# Patient Record
Sex: FEMALE | Race: WHITE | NOT HISPANIC OR LATINO | Employment: FULL TIME | URBAN - METROPOLITAN AREA
[De-identification: names, ages, dates, MRNs, and addresses within clinical notes are randomized per-mention and may not be internally consistent; named-entity substitution may affect disease eponyms.]

---

## 2017-01-12 ENCOUNTER — OFFICE VISIT (OUTPATIENT)
Dept: FAMILY MEDICINE | Facility: CLINIC | Age: 37
End: 2017-01-12
Payer: COMMERCIAL

## 2017-01-12 VITALS
HEART RATE: 81 BPM | SYSTOLIC BLOOD PRESSURE: 113 MMHG | HEIGHT: 64 IN | OXYGEN SATURATION: 99 % | DIASTOLIC BLOOD PRESSURE: 79 MMHG | WEIGHT: 174 LBS | BODY MASS INDEX: 29.71 KG/M2

## 2017-01-12 DIAGNOSIS — R00.2 HEART PALPITATIONS: ICD-10-CM

## 2017-01-12 DIAGNOSIS — F33.0 MAJOR DEPRESSIVE DISORDER, RECURRENT EPISODE, MILD (H): Primary | ICD-10-CM

## 2017-01-12 DIAGNOSIS — N92.1 BREAKTHROUGH BLEEDING ON DEPO PROVERA: ICD-10-CM

## 2017-01-12 LAB
ALBUMIN SERPL-MCNC: 3.9 G/DL (ref 3.4–5)
ANION GAP SERPL CALCULATED.3IONS-SCNC: 9 MMOL/L (ref 3–14)
BUN SERPL-MCNC: 17 MG/DL (ref 7–30)
CALCIUM SERPL-MCNC: 9.1 MG/DL (ref 8.5–10.1)
CHLORIDE SERPL-SCNC: 110 MMOL/L (ref 94–109)
CO2 SERPL-SCNC: 23 MMOL/L (ref 20–32)
CREAT SERPL-MCNC: 0.92 MG/DL (ref 0.52–1.04)
ERYTHROCYTE [DISTWIDTH] IN BLOOD BY AUTOMATED COUNT: 13.5 % (ref 10–15)
GFR SERPL CREATININE-BSD FRML MDRD: 70 ML/MIN/1.7M2
GLUCOSE SERPL-MCNC: 92 MG/DL (ref 70–99)
HCT VFR BLD AUTO: 42.2 % (ref 35–47)
HGB BLD-MCNC: 13.9 G/DL (ref 11.7–15.7)
MCH RBC QN AUTO: 29.9 PG (ref 26.5–33)
MCHC RBC AUTO-ENTMCNC: 32.9 G/DL (ref 31.5–36.5)
MCV RBC AUTO: 91 FL (ref 78–100)
PHOSPHATE SERPL-MCNC: 2.9 MG/DL (ref 2.5–4.5)
PLATELET # BLD AUTO: 151 10E9/L (ref 150–450)
POTASSIUM SERPL-SCNC: 4.2 MMOL/L (ref 3.4–5.3)
RBC # BLD AUTO: 4.65 10E12/L (ref 3.8–5.2)
SODIUM SERPL-SCNC: 142 MMOL/L (ref 133–144)
TSH SERPL DL<=0.005 MIU/L-ACNC: 0.95 MU/L (ref 0.4–4)
WBC # BLD AUTO: 4.4 10E9/L (ref 4–11)

## 2017-01-12 PROCEDURE — 85027 COMPLETE CBC AUTOMATED: CPT | Performed by: PHYSICIAN ASSISTANT

## 2017-01-12 PROCEDURE — 84443 ASSAY THYROID STIM HORMONE: CPT | Performed by: PHYSICIAN ASSISTANT

## 2017-01-12 PROCEDURE — 99214 OFFICE O/P EST MOD 30 MIN: CPT | Performed by: PHYSICIAN ASSISTANT

## 2017-01-12 PROCEDURE — 36415 COLL VENOUS BLD VENIPUNCTURE: CPT | Performed by: PHYSICIAN ASSISTANT

## 2017-01-12 PROCEDURE — 80069 RENAL FUNCTION PANEL: CPT | Performed by: PHYSICIAN ASSISTANT

## 2017-01-12 RX ORDER — ESCITALOPRAM OXALATE 20 MG/1
20 TABLET ORAL DAILY
Qty: 90 TABLET | Refills: 1 | Status: SHIPPED | OUTPATIENT
Start: 2017-01-12 | End: 2017-09-08

## 2017-01-12 ASSESSMENT — ANXIETY QUESTIONNAIRES
1. FEELING NERVOUS, ANXIOUS, OR ON EDGE: NOT AT ALL
GAD7 TOTAL SCORE: 1
6. BECOMING EASILY ANNOYED OR IRRITABLE: NOT AT ALL
5. BEING SO RESTLESS THAT IT IS HARD TO SIT STILL: NOT AT ALL
3. WORRYING TOO MUCH ABOUT DIFFERENT THINGS: NOT AT ALL
7. FEELING AFRAID AS IF SOMETHING AWFUL MIGHT HAPPEN: NOT AT ALL
2. NOT BEING ABLE TO STOP OR CONTROL WORRYING: SEVERAL DAYS

## 2017-01-12 ASSESSMENT — PATIENT HEALTH QUESTIONNAIRE - PHQ9: 5. POOR APPETITE OR OVEREATING: NOT AT ALL

## 2017-01-12 NOTE — NURSING NOTE
"Chief Complaint   Patient presents with     Depression     Other       Initial /79 mmHg  Pulse 81  Ht 5' 4\" (1.626 m)  Wt 174 lb (78.926 kg)  BMI 29.85 kg/m2  SpO2 99% Estimated body mass index is 29.85 kg/(m^2) as calculated from the following:    Height as of this encounter: 5' 4\" (1.626 m).    Weight as of this encounter: 174 lb (78.926 kg).  BP completed using cuff size: regular      Dodie Mckeon MA    "

## 2017-01-13 ASSESSMENT — PATIENT HEALTH QUESTIONNAIRE - PHQ9: SUM OF ALL RESPONSES TO PHQ QUESTIONS 1-9: 1

## 2017-01-13 ASSESSMENT — ANXIETY QUESTIONNAIRES: GAD7 TOTAL SCORE: 1

## 2017-03-10 ENCOUNTER — TELEPHONE (OUTPATIENT)
Dept: FAMILY MEDICINE | Facility: CLINIC | Age: 37
End: 2017-03-10

## 2017-03-10 ENCOUNTER — ALLIED HEALTH/NURSE VISIT (OUTPATIENT)
Dept: NURSING | Facility: CLINIC | Age: 37
End: 2017-03-10
Payer: MEDICAID

## 2017-03-10 DIAGNOSIS — Z30.9 CONTRACEPTIVE MANAGEMENT: Primary | ICD-10-CM

## 2017-03-10 LAB — BETA HCG QUAL IFA URINE: NEGATIVE

## 2017-03-10 PROCEDURE — 84703 CHORIONIC GONADOTROPIN ASSAY: CPT | Performed by: FAMILY MEDICINE

## 2017-03-10 PROCEDURE — 96372 THER/PROPH/DIAG INJ SC/IM: CPT

## 2017-03-10 NOTE — TELEPHONE ENCOUNTER
Left voicemail for patient to return call to clinic. Patient Depo window is Feb 27th- March 13th. Please help patient schedule ancillary appointment. Thank you.  Dodie Mckeon MA

## 2017-03-10 NOTE — NURSING NOTE
BLOOD PRESSURE: 114 68  (If over 140/90- RN or Provider needs to evaluate)     WEIGHT: 174lb    DATE OF LAST PAP or ANNUAL EXAM: PAP  8/18/14     URINE HCG:negative    MEDICATION: Depo Provera 150mg  ROUTE: IM  SITE: RUQ - Gluteus  : [employer]  LOT #: [id number]  EXPIRATION: 7/2019  NEXT INJECTION DUE: 5/26/17 - 6/9/17  Provider: Kristen Kehr  Reminder card given to patient? Yes   Date order expires: 6/16/17  Last appointment by a provider: 1/12/17    Tracy Nelson MA

## 2017-03-10 NOTE — MR AVS SNAPSHOT
After Visit Summary   3/10/2017    Mary Ma    MRN: [id number]           Patient Information     Date Of Birth          1980        Visit Information        Provider Department      3/10/2017 2:00 PM NE ANCILLARY [facility]        Today's Diagnoses     Contraceptive management    -  1       Follow-ups after your visit        Who to contact     If you have questions or need follow up information about today's clinic visit or your schedule please contact [facility] directly at [phone].  Normal or non-critical lab and imaging results will be communicated to you by ClassBughart, letter or phone within 4 business days after the clinic has received the results. If you do not hear from us within 7 days, please contact the clinic through ClassBughart or phone. If you have a critical or abnormal lab result, we will notify you by phone as soon as possible.  Submit refill requests through ClassPass or call your pharmacy and they will forward the refill request to us. Please allow 3 business days for your refill to be completed.          Additional Information About Your Visit        MyChart Information     ClassPass gives you secure access to your electronic health record. If you see a primary care provider, you can also send messages to your care team and make appointments. If you have questions, please call your primary care clinic.  If you do not have a primary care provider, please call [phone] and they will assist you.        Care EveryWhere ID     This is your Care EveryWhere ID. This could be used by other organizations to access your [place] medical records  [id number]         Blood Pressure from Last 3 Encounters:   01/12/17 113/79   12/12/16 103/68   11/01/16 115/75    Weight from Last 3 Encounters:   01/12/17 174 lb (78.9 kg)   12/12/16 180 lb (81.6 kg)   11/01/16 182 lb (82.6 kg)              We Performed the Following     Beta HCG qual IFA urine      MEDROXYPROGESTERONE INJ        Primary Care Provider Office Phone # Fax #    Nita M Kehr, PA-C [phone]       [facility] [address] [facility] [address]        Thank you!     Thank you for choosing [facility]  for your care. Our goal is always to provide you with excellent care. Hearing back from our patients is one way we can continue to improve our services. Please take a few minutes to complete the written survey that you may receive in the mail after your visit with us. Thank you!             Your Updated Medication List - Protect others around you: Learn how to safely use, store and throw away your medicines at www.disposemymeds.org.          This list is accurate as of: 3/10/17  2:35 PM.  Always use your most recent med list.                   Brand Name Dispense Instructions for use    escitalopram 20 MG tablet    LEXAPRO    90 tablet    Take 1 tablet (20 mg) by mouth daily APPT NEEDED FOR FURTHER REFILLS       medroxyPROGESTERone 150 MG/ML injection    DEPO-PROVERA    1 mL    Inject 1 mL (150 mg) into the muscle every 3 months To be given in clinic       pantoprazole 40 MG EC tablet    PROTONIX    90 tablet    Take 1 tablet (40 mg) by mouth daily Take 30-60 minutes before a meal.  APPT NEEDED FOR FURTHER REFILLS

## 2017-03-13 NOTE — TELEPHONE ENCOUNTER
I see per the chart that the patient already received her Depo injections.  This question has already been answered. Dolly Garcia M.A.

## 2017-03-27 ENCOUNTER — APPOINTMENT (OUTPATIENT)
Dept: OPTOMETRY | Facility: CLINIC | Age: 37
End: 2017-03-27
Payer: MEDICAID

## 2017-03-27 PROCEDURE — 92340 FIT SPECTACLES MONOFOCAL: CPT | Performed by: OPTOMETRIST

## 2017-04-21 ENCOUNTER — COMMUNICATION - HEALTHEAST (OUTPATIENT)
Dept: UROLOGY | Facility: CLINIC | Age: 37
End: 2017-04-21

## 2017-04-21 ENCOUNTER — OFFICE VISIT - HEALTHEAST (OUTPATIENT)
Dept: UROLOGY | Facility: CLINIC | Age: 37
End: 2017-04-21

## 2017-04-21 DIAGNOSIS — N13.2 HYDRONEPHROSIS WITH URINARY OBSTRUCTION DUE TO URETERAL CALCULUS: ICD-10-CM

## 2017-04-21 DIAGNOSIS — N20.9 URINARY CALCULUS, UNSPECIFIED: ICD-10-CM

## 2017-04-21 DIAGNOSIS — N20.1 CALCULUS OF URETER: ICD-10-CM

## 2017-04-21 DIAGNOSIS — N23 RENAL COLIC: ICD-10-CM

## 2017-04-21 ASSESSMENT — MIFFLIN-ST. JEOR: SCORE: 1454.26

## 2017-04-23 ENCOUNTER — COMMUNICATION - HEALTHEAST (OUTPATIENT)
Dept: SCHEDULING | Facility: CLINIC | Age: 37
End: 2017-04-23

## 2017-04-28 ENCOUNTER — AMBULATORY - HEALTHEAST (OUTPATIENT)
Dept: LAB | Facility: CLINIC | Age: 37
End: 2017-04-28

## 2017-04-28 ENCOUNTER — AMBULATORY - HEALTHEAST (OUTPATIENT)
Dept: UROLOGY | Facility: CLINIC | Age: 37
End: 2017-04-28

## 2017-04-28 DIAGNOSIS — N20.9 URINARY CALCULUS, UNSPECIFIED: ICD-10-CM

## 2017-05-03 LAB
1ST CONSTITUENT:: NORMAL
2ND CONSTITUENT:: NORMAL
SOURCE: NORMAL

## 2017-06-09 ENCOUNTER — ALLIED HEALTH/NURSE VISIT (OUTPATIENT)
Dept: NURSING | Facility: CLINIC | Age: 37
End: 2017-06-09
Payer: COMMERCIAL

## 2017-06-09 VITALS
DIASTOLIC BLOOD PRESSURE: 64 MMHG | TEMPERATURE: 98.1 F | HEART RATE: 68 BPM | SYSTOLIC BLOOD PRESSURE: 102 MMHG | WEIGHT: 168.2 LBS | BODY MASS INDEX: 28.71 KG/M2 | HEIGHT: 64 IN

## 2017-06-09 DIAGNOSIS — Z30.42 ENCOUNTER FOR SURVEILLANCE OF INJECTABLE CONTRACEPTIVE: Primary | ICD-10-CM

## 2017-06-09 PROCEDURE — 96372 THER/PROPH/DIAG INJ SC/IM: CPT

## 2017-06-09 NOTE — MR AVS SNAPSHOT
After Visit Summary   6/9/2017    Mary Ma    MRN: [id number]           Patient Information     Date Of Birth          1980        Visit Information        Provider Department      6/9/2017 2:00 PM NE ANCILLARY [facility]        Today's Diagnoses     Encounter for surveillance of injectable contraceptive    -  1       Follow-ups after your visit        Your next 10 appointments already scheduled     Aug 25, 2017  3:20 PM CDT   PHYSICAL with Alfonso Clay DO   [facility] ([facility])    [address] [place] [id number] [phone]              Who to contact     If you have questions or need follow up information about today's clinic visit or your schedule please contact [facility] directly at [phone].  Normal or non-critical lab and imaging results will be communicated to you by MyChart, letter or phone within 4 business days after the clinic has received the results. If you do not hear from us within 7 days, please contact the clinic through MyChart or phone. If you have a critical or abnormal lab result, we will notify you by phone as soon as possible.  Submit refill requests through Qloo or call your pharmacy and they will forward the refill request to us. Please allow 3 business days for your refill to be completed.          Additional Information About Your Visit        MyChart Information     Qloo gives you secure access to your electronic health record. If you see a primary care provider, you can also send messages to your care team and make appointments. If you have questions, please call your primary care clinic.  If you do not have a primary care provider, please call [phone] and they will assist you.        Care EveryWhere ID     This is your Care EveryWhere ID. This could be used by other organizations to access your [place] medical records  [id number]    "     Your Vitals Were     Pulse Temperature Height BMI (Body Mass Index)          68 98.1  F (36.7  C) (Oral) 5' 4\" (1.626 m) 28.87 kg/m2         Blood Pressure from Last 3 Encounters:   06/09/17 102/64   01/12/17 113/79   12/12/16 103/68    Weight from Last 3 Encounters:   06/09/17 168 lb 3.2 oz (76.3 kg)   01/12/17 174 lb (78.9 kg)   12/12/16 180 lb (81.6 kg)              We Performed the Following     INJECTION INTRAMUSCULAR OR SUB-Q     Medroxyprogesterone inj/1mg (Depo Provera J-Code)        Primary Care Provider Office Phone # Fax #    Kristen M Kehr, PA-C [phone]       [facility] [address] [facility] [address]        Thank you!     Thank you for choosing [facility]  for your care. Our goal is always to provide you with excellent care. Hearing back from our patients is one way we can continue to improve our services. Please take a few minutes to complete the written survey that you may receive in the mail after your visit with us. Thank you!             Your Updated Medication List - Protect others around you: Learn how to safely use, store and throw away your medicines at www.disposemymeds.org.          This list is accurate as of: 6/9/17  2:41 PM.  Always use your most recent med list.                   Brand Name Dispense Instructions for use    escitalopram 20 MG tablet    LEXAPRO    90 tablet    Take 1 tablet (20 mg) by mouth daily APPT NEEDED FOR FURTHER REFILLS       medroxyPROGESTERone 150 MG/ML injection    DEPO-PROVERA    1 mL    Inject 1 mL (150 mg) into the muscle every 3 months To be given in clinic       pantoprazole 40 MG EC tablet    PROTONIX    90 tablet    Take 1 tablet (40 mg) by mouth daily Take 30-60 minutes before a meal.  APPT NEEDED FOR FURTHER REFILLS         "

## 2017-06-09 NOTE — PROGRESS NOTES
BLOOD PRESSURE: 102/64  (If over 140/90- RN or Provider needs to evaluate)     WEIGHT: 168 lbs    DATE OF LAST PAP or ANNUAL EXAM: PAP     8/18/14  URINE HCG:not indicated    MEDICATION: Depo Provera 150mg  ROUTE: IM  SITE: LUQ - Gluteus  : [employer]  LOT #: [id number]  EXPIRATION:11/2019  ND: [address]-0  NEXT INJECTION DUE:  Aug 25th- Sept 8th  Provider: Leif Singer MD  Reminder card given to patient? Yes   Date order expires: 6/16/17  Last appointment by a provider: 1/12/17      Ashley Garcia MA

## 2017-06-09 NOTE — NURSING NOTE
MEDICATION: Depo Provera 150mg  ROUTE: IM  SITE: LUQ - Gluteus  : [employer]  LOT #: [id number]  EXPIRATION:11/2019  NDC: [address]-6  NEXT INJECTION DUE:  Aug 25th- Sept 8th  Provider: Leif Singer MD  Reminder card given to patient? Yes   Date order expires: 6/16/17  Last appointment by a provider: 1/12/17      Ashley Garcia MA        Patient notified that Depo order is expiring this month- she will need to get new orders if she would like to continue. Patient stated she is closer to this clinic now and would like to establish care here. Appt scheduled  with Dr. Clay Aug 25th @ 3:25pm- she can get new orders and Depo at that visit.    Ashley Garcia MA

## 2017-09-08 ENCOUNTER — OFFICE VISIT (OUTPATIENT)
Dept: FAMILY MEDICINE | Facility: CLINIC | Age: 37
End: 2017-09-08
Payer: COMMERCIAL

## 2017-09-08 VITALS
SYSTOLIC BLOOD PRESSURE: 122 MMHG | BODY MASS INDEX: 27.89 KG/M2 | TEMPERATURE: 98 F | WEIGHT: 163.38 LBS | OXYGEN SATURATION: 98 % | DIASTOLIC BLOOD PRESSURE: 76 MMHG | HEART RATE: 66 BPM | HEIGHT: 64 IN

## 2017-09-08 DIAGNOSIS — Z30.42 ENCOUNTER FOR SURVEILLANCE OF INJECTABLE CONTRACEPTIVE: ICD-10-CM

## 2017-09-08 DIAGNOSIS — F41.1 GAD (GENERALIZED ANXIETY DISORDER): ICD-10-CM

## 2017-09-08 DIAGNOSIS — K21.9 GASTROESOPHAGEAL REFLUX DISEASE WITHOUT ESOPHAGITIS: ICD-10-CM

## 2017-09-08 DIAGNOSIS — Z12.4 SCREENING FOR MALIGNANT NEOPLASM OF CERVIX: ICD-10-CM

## 2017-09-08 DIAGNOSIS — F33.0 MAJOR DEPRESSIVE DISORDER, RECURRENT EPISODE, MILD (H): ICD-10-CM

## 2017-09-08 DIAGNOSIS — R53.83 OTHER FATIGUE: ICD-10-CM

## 2017-09-08 DIAGNOSIS — Z00.01 ENCOUNTER FOR ROUTINE ADULT HEALTH EXAMINATION WITH ABNORMAL FINDINGS: Primary | ICD-10-CM

## 2017-09-08 PROCEDURE — 99212 OFFICE O/P EST SF 10 MIN: CPT | Mod: 25 | Performed by: FAMILY MEDICINE

## 2017-09-08 PROCEDURE — G0145 SCR C/V CYTO,THINLAYER,RESCR: HCPCS | Performed by: FAMILY MEDICINE

## 2017-09-08 PROCEDURE — 99395 PREV VISIT EST AGE 18-39: CPT | Mod: 25 | Performed by: FAMILY MEDICINE

## 2017-09-08 PROCEDURE — 87624 HPV HI-RISK TYP POOLED RSLT: CPT | Performed by: FAMILY MEDICINE

## 2017-09-08 PROCEDURE — 96372 THER/PROPH/DIAG INJ SC/IM: CPT | Performed by: FAMILY MEDICINE

## 2017-09-08 RX ORDER — PANTOPRAZOLE SODIUM 40 MG/1
40 TABLET, DELAYED RELEASE ORAL DAILY
Qty: 90 TABLET | Refills: 0 | Status: CANCELLED | OUTPATIENT
Start: 2017-09-08

## 2017-09-08 RX ORDER — ESCITALOPRAM OXALATE 20 MG/1
20 TABLET ORAL DAILY
Qty: 90 TABLET | Refills: 0 | Status: SHIPPED | OUTPATIENT
Start: 2017-09-08

## 2017-09-08 RX ORDER — FAMOTIDINE 40 MG/1
40 TABLET, FILM COATED ORAL AT BEDTIME
Qty: 30 TABLET | Refills: 1 | Status: SHIPPED | OUTPATIENT
Start: 2017-09-08

## 2017-09-08 RX ORDER — CHOLECALCIFEROL (VITAMIN D3) 50 MCG
2000 TABLET ORAL 2 TIMES DAILY
Qty: 100 TABLET | Refills: 3 | Status: SHIPPED | OUTPATIENT
Start: 2017-09-08

## 2017-09-08 RX ORDER — MEDROXYPROGESTERONE ACETATE 150 MG/ML
150 INJECTION, SUSPENSION INTRAMUSCULAR
Qty: 1 ML | Refills: 0 | OUTPATIENT
Start: 2017-09-08

## 2017-09-08 RX ORDER — PANTOPRAZOLE SODIUM 20 MG/1
TABLET, DELAYED RELEASE ORAL
Qty: 30 TABLET | Refills: 0 | Status: SHIPPED | OUTPATIENT
Start: 2017-09-08

## 2017-09-08 ASSESSMENT — ANXIETY QUESTIONNAIRES
GAD7 TOTAL SCORE: 3
2. NOT BEING ABLE TO STOP OR CONTROL WORRYING: NOT AT ALL
6. BECOMING EASILY ANNOYED OR IRRITABLE: SEVERAL DAYS
1. FEELING NERVOUS, ANXIOUS, OR ON EDGE: MORE THAN HALF THE DAYS
3. WORRYING TOO MUCH ABOUT DIFFERENT THINGS: NOT AT ALL
5. BEING SO RESTLESS THAT IT IS HARD TO SIT STILL: NOT AT ALL
7. FEELING AFRAID AS IF SOMETHING AWFUL MIGHT HAPPEN: NOT AT ALL

## 2017-09-08 ASSESSMENT — PATIENT HEALTH QUESTIONNAIRE - PHQ9: 5. POOR APPETITE OR OVEREATING: NOT AT ALL

## 2017-09-08 NOTE — PROGRESS NOTES
SUBJECTIVE:   CC: Mary Ma is an 36 year old woman who presents for preventive health visit.     Physical   Annual:     Getting at least 3 servings of Calcium per day::  NO    Bi-annual eye exam::  Yes    Dental care twice a year::  Yes    Sleep apnea or symptoms of sleep apnea::  Daytime drowsiness    Diet::  Regular (no restrictions)    Frequency of exercise::  4-5 days/week    Duration of exercise::  15-30 minutes    Taking medications regularly::  Yes    Medication side effects::  None    Additional concerns today::  YES (Patient thinks she sleeps to much, is always tired. Getting 10 hours of sleep a night.  Needs order for depo. )      Anxiety Follow-Up    Status since last visit: Lexapro is working well for her    Other associated symptoms:She has been very tired lately    Complicating factors:   Significant life event: New job in December and moved in March  Current substance abuse: None  Depression symptoms: No  ARIELLA-7 SCORE 6/26/2015 1/12/2017 9/8/2017   Total Score 8 - -   Total Score - 1 3     Has been on lexapro and has done well  Has had therapy ,  And is finding a new [employer]    GAD7          GERD for  For every PPI works in the past, has had EGD in 2016 and was normal        She does not want kids she will talk about it with her  about tubal  3 kids and does not want kids  DEpo-5 years          Today's PHQ-2 Score:   PHQ-2 ( 1999 [employer]) 9/8/2017   Q1: Little interest or pleasure in doing things 0   Q2: Feeling down, depressed or hopeless 0   PHQ-2 Score 0   Q1: Little interest or pleasure in doing things Not at all   Q2: Feeling down, depressed or hopeless Not at all   PHQ-2 Score 0       Abuse: Current or Past(Physical, Sexual or Emotional)- No  Do you feel safe in your environment - Yes    Social History   Substance Use Topics     Smoking status: Former Smoker     Smokeless tobacco: Never Used      Comment: no 2nd hand smoke exposure     Alcohol use No     The patient does not drink  ">3 drinks per day nor >7 drinks per week.    Reviewed orders with patient.  Reviewed health maintenance and updated orders accordingly - Yes  Labs reviewed in EPIC    Mammogram not appropriate for this patient based on age.    Pertinent mammograms are reviewed under the imaging tab.  History of abnormal Pap smear: NO - age 30- 65 PAP every 3 years recommended    Reviewed and updated as needed this visit by clinical staff  Tobacco  Allergies  Med Hx  Surg Hx  Fam Hx  Soc Hx        Reviewed and updated as needed this visit by Provider        Past Medical History:   Diagnosis Date     Anxiety      TALYA I (cervical intraepithelial neoplasia I) 2005    on colp (care everywhere)     GERD (gastroesophageal reflux disease)      Migraine      Mild major depression (H) 8/19/2014     Obesity      Other fatigue 12/16/2015     Other fatigue         ROS:  C: NEGATIVE for fever, chills, change in weight  I: NEGATIVE for worrisome rashes, moles or lesions  E: NEGATIVE for vision changes or irritation  ENT: NEGATIVE for ear, mouth and throat problems  R: NEGATIVE for significant cough or SOB  B: NEGATIVE for masses, tenderness or discharge  CV: NEGATIVE for chest pain, palpitations or peripheral edema  GI: NEGATIVE for nausea, abdominal pain, heartburn, or change in bowel habits  : NEGATIVE for unusual urinary or vaginal symptoms. Periods are regular.  M: NEGATIVE for significant arthralgias or myalgia  N: NEGATIVE for weakness, dizziness or paresthesias  P: NEGATIVE for changes in mood or affect     OBJECTIVE:   /76 (BP Location: Left arm, Patient Position: Sitting, Cuff Size: Adult Regular)  Pulse 66  Temp 98  F (36.7  C) (Oral)  Ht 5' 4\" (1.626 m)  Wt 163 lb 6 oz (74.1 kg)  SpO2 98%  BMI 28.04 kg/m2  EXAM:  GENERAL: healthy, alert and no distress  EYES: Eyes grossly normal to inspection, PERRL and conjunctivae and sclerae normal  HENT: ear canals and TM's normal, nose and mouth without ulcers or lesions  NECK: " no adenopathy, no asymmetry, masses, or scars and thyroid normal to palpation  RESP: lungs clear to auscultation - no rales, rhonchi or wheezes  BREAST: normal without masses, tenderness or nipple discharge and no palpable axillary masses or adenopathy  CV: regular rate and rhythm, normal S1 S2, no S3 or S4, no murmur, click or rub, no peripheral edema and peripheral pulses strong  ABDOMEN: soft, nontender, no hepatosplenomegaly, no masses and bowel sounds normal  MS: no gross musculoskeletal defects noted, no edema  SKIN: no suspicious lesions or rashes  NEURO: Normal strength and tone, mentation intact and speech normal  PSYCH: mentation appears normal, affect normal/bright    ASSESSMENT/PLAN:       ICD-10-CM    1. Encounter for routine adult health examination with abnormal findings Z00.01    2. Major depressive disorder, recurrent episode, mild (H) F33.0 escitalopram (LEXAPRO) 20 MG tablet   3. Gastroesophageal reflux disease without esophagitis K21.9 pantoprazole (PROTONIX) 20 MG EC tablet     famotidine (PEPCID) 40 MG tablet   4. Other fatigue R53.83    5. ARIELLA (generalized anxiety disorder) F41.1    6. Encounter for surveillance of injectable contraceptive Z30.42 medroxyPROGESTERone (DEPO-PROVERA) 150 MG/ML injection     Cholecalciferol (VITAMIN D) 2000 UNITS tablet     INJECTION INTRAMUSCULAR OR SUB-Q     Medroxyprogesterone inj/1mg (Depo Provera J-Code)   7. Screening for malignant neoplasm of cervix Z12.4 Pap imaged thin layer screen with HPV - recommended age 30 - 65     HPV High Risk Types DNA Cervical   new patient to this clinic  Major depression-stable per patient  GERD-history of egd-normal findings, advised decreasing Protonix, pepcid as needed.  Fatigue-advised supplementing vit D, follow up in few weeks to recheck  Contraception-depo for now, advised changing to another form of contraception, reviewed all options, consider iud  I have discussed any lab or imaging results, the patient's diagnosis,  "and my plan of treatment with the patient and/or family. Patient is aware to come back in if with worsening symptoms or if no relief despite treatment plan.  Patient voiced understanding and had no further questions.     Follow up in 8 weeks  COUNSELING:  Reviewed preventive health counseling, as reflected in patient instructions         reports that she has quit smoking. She has never used smokeless tobacco.    Estimated body mass index is 28.04 kg/(m^2) as calculated from the following:    Height as of this encounter: 5' 4\" (1.626 m).    Weight as of this encounter: 163 lb 6 oz (74.1 kg).   Weight management plan: Discussed healthy diet and exercise guidelines and patient will follow up in 3 months in clinic to re-evaluate.    Counseling Resources:  ATP IV Guidelines  Pooled Cohorts Equation Calculator  Breast Cancer Risk Calculator  FRAX Risk Assessment  ICSI Preventive Guidelines  Dietary Guidelines for Americans, 2010  USDA's MyPlate  ASA Prophylaxis  Lung CA Screening    Alfonso Clay DO  [facility]  Answers for HPI/ROS submitted by the patient on 9/8/2017   PHQ-2 Score: 0    "

## 2017-09-08 NOTE — MR AVS SNAPSHOT
After Visit Summary   9/8/2017    Mary Ma    MRN: [id number]           Patient Information     Date Of Birth          1980        Visit Information        Provider Department      9/8/2017 11:40 AM Alfonso Clay DO [facility]        Today's Diagnoses     Other fatigue    -  1    Encounter for routine adult health examination with abnormal findings        Major depressive disorder, recurrent episode, mild (H)        Gastroesophageal reflux disease without esophagitis        ARIELLA (generalized anxiety disorder)        Encounter for surveillance of injectable contraceptive        Screening for malignant neoplasm of cervix          Care Instructions    Consider getting off depo  Read IUD handout  Use calcium and vit D supplementation  Decrease protonix use, decreased dose today  Follow up in 1 month to recheck  pepcid as needed  Come fasting for follow up      Preventive Health Recommendations  Female Ages 26 - 39  Yearly exam:   See your health care provider every year in order to    Review health changes.     Discuss preventive care.      Review your medicines if you your doctor has prescribed any.    Until age 30: Get a Pap test every three years (more often if you have had an abnormal result).    After age 30: Talk to your doctor about whether you should have a Pap test every 3 years or have a Pap test with HPV screening every 5 years.   You do not need a Pap test if your uterus was removed (hysterectomy) and you have not had cancer.  You should be tested each year for STDs (sexually transmitted diseases), if you're at risk.   Talk to your provider about how often to have your cholesterol checked.  If you are at risk for diabetes, you should have a diabetes test (fasting glucose).  Shots: Get a flu shot each year. Get a tetanus shot every 10 years.   Nutrition:     Eat at least 5 servings of fruits and vegetables each day.    Eat whole-grain bread, whole-wheat  "pasta and brown rice instead of white grains and rice.    Talk to your provider about Calcium and Vitamin D.     Lifestyle    Exercise at least 150 minutes a week (30 minutes a day, 5 days of the week). This will help you control your weight and prevent disease.    Limit alcohol to one drink per day.    No smoking.     Wear sunscreen to prevent skin cancer.    See your dentist every six months for an exam and cleaning.            Follow-ups after your visit        Who to contact     If you have questions or need follow up information about today's clinic visit or your schedule please contact [facility] [place] directly at [phone].  Normal or non-critical lab and imaging results will be communicated to you by Cy[employer]Viv[employer]art, letter or phone within 4 business days after the clinic has received the results. If you do not hear from us within 7 days, please contact the clinic through [employer] or phone. If you have a critical or abnormal lab result, we will notify you by phone as soon as possible.  Submit refill requests through [employer] or call your pharmacy and they will forward the refill request to us. Please allow 3 business days for your refill to be completed.          Additional Information About Your Visit        [employer] Information     [employer] gives you secure access to your electronic health record. If you see a primary care provider, you can also send messages to your care team and make appointments. If you have questions, please call your primary care clinic.  If you do not have a primary care provider, please call [phone] and they will assist you.        Care EveryWhere ID     This is your Care EveryWhere ID. This could be used by other organizations to access your [facility] medical records  [id number]        Your Vitals Were     Pulse Temperature Height Pulse Oximetry BMI (Body Mass Index)       66 98  F (36.7  C) (Oral) 5' 4\" (1.626 m) 98% 28.04 kg/m2        Blood Pressure from Last 3 " Encounters:   09/08/17 122/76   06/09/17 102/64   01/12/17 113/79    Weight from Last 3 Encounters:   09/08/17 163 lb 6 oz (74.1 kg)   06/09/17 168 lb 3.2 oz (76.3 kg)   01/12/17 174 lb (78.9 kg)              We Performed the Following     HPV High Risk Types DNA Cervical     Pap imaged thin layer screen with HPV - recommended age 30 - 65          Today's Medication Changes          These changes are accurate as of: 9/8/17 12:38 PM.  If you have any questions, ask your nurse or doctor.               Start taking these medicines.        Dose/Directions    famotidine 40 MG tablet   Commonly known as:  PEPCID   Used for:  Gastroesophageal reflux disease without esophagitis   Started by:  Alfonso Clay DO        Dose:  40 mg   Take 1 tablet (40 mg) by mouth At Bedtime   Quantity:  30 tablet   Refills:  1       vitamin D 2000 UNITS tablet   Used for:  Encounter for surveillance of injectable contraceptive   Started by:  Alfonso Clay DO        Dose:  2000 Units   Take 2,000 Units by mouth 2 times daily   Quantity:  100 tablet   Refills:  3         These medicines have changed or have updated prescriptions.        Dose/Directions    * medroxyPROGESTERone 150 MG/ML injection   Commonly known as:  DEPO-PROVERA   This may have changed:  Another medication with the same name was added. Make sure you understand how and when to take each.   Used for:  Encounter for surveillance of injectable contraceptive   Changed by:  Kehr, Kristen M, PA-C        Dose:  150 mg   Inject 1 mL (150 mg) into the muscle every 3 months To be given in clinic   Quantity:  1 mL   Refills:  3       * medroxyPROGESTERone 150 MG/ML injection   Commonly known as:  DEPO-PROVERA   This may have changed:  You were already taking a medication with the same name, and this prescription was added. Make sure you understand how and when to take each.   Used for:  Encounter for surveillance of injectable contraceptive   Changed by:  Obed  Alfonso Roberto DO        Dose:  150 mg   Inject 1 mL (150 mg) into the muscle every 3 months   Quantity:  1 mL   Refills:  0       * pantoprazole 40 MG EC tablet   Commonly known as:  PROTONIX   This may have changed:  Another medication with the same name was added. Make sure you understand how and when to take each.   Used for:  Gastroesophageal reflux disease without esophagitis   Changed by:  Kehr, Kristen M, PA-C        Dose:  40 mg   Take 1 tablet (40 mg) by mouth daily Take 30-60 minutes before a meal.  APPT NEEDED FOR FURTHER REFILLS   Quantity:  90 tablet   Refills:  0       * pantoprazole 20 MG EC tablet   Commonly known as:  PROTONIX   This may have changed:  You were already taking a medication with the same name, and this prescription was added. Make sure you understand how and when to take each.   Used for:  Gastroesophageal reflux disease without esophagitis   Changed by:  Alfonso Clay DO        Take by mouth 30-60 minutes before a meal.   Quantity:  30 tablet   Refills:  0       * Notice:  This list has 4 medication(s) that are the same as other medications prescribed for you. Read the directions carefully, and ask your doctor or other care provider to review them with you.         Where to get your medicines      These [facility] to [id number] - [place], [place] - [address] AT [place] [address] & [address], [place] [address]    Hours:  24-hours Phone:  [phone]     escitalopram 20 MG tablet    famotidine 40 MG tablet    pantoprazole 20 MG EC tablet    vitamin D 2000 UNITS tablet         Some of these will need a paper prescription and others can be bought over the counter.  Ask your nurse if you have questions.     You don't need a prescription for these medications     medroxyPROGESTERone 150 MG/ML injection                Primary Care Provider Office Phone # Fax #    Kristen M Kehr, PA-C [phone]        [address] [facility] [address]        Equal Access to Services     DG LOPEZ : Hadii aad ku hadbrandonraymond Cassieali, waameliada jorgefidelha, qanegrata kacarolesamian ambrocio, alonso hopkinsraffaeledilma gill. So [facility] [phone].    ATENCIÓN: Si habla español, tiene a saenz disposición servicios gratuitos de asistencia lingüística. Llame al [phone].    We comply with applicable federal civil rights laws and [place] laws. We do not discriminate on the basis of race, color, national origin, age, disability sex, sexual orientation or gender identity.            Thank you!     Thank you for choosing [facility]  for your care. Our goal is always to provide you with excellent care. Hearing back from our patients is one way we can continue to improve our services. Please take a few minutes to complete the written survey that you may receive in the mail after your visit with us. Thank you!             Your Updated Medication List - Protect others around you: Learn how to safely use, store and throw away your medicines at www.disposemymeds.org.          This list is accurate as of: 9/8/17 12:38 PM.  Always use your most recent med list.                   Brand Name Dispense Instructions for use Diagnosis    escitalopram 20 MG tablet    LEXAPRO    90 tablet    Take 1 tablet (20 mg) by mouth daily APPT NEEDED FOR FURTHER REFILLS    Major depressive disorder, recurrent episode, mild (H)       famotidine 40 MG tablet    PEPCID    30 tablet    Take 1 tablet (40 mg) by mouth At Bedtime    Gastroesophageal reflux disease without esophagitis       * medroxyPROGESTERone 150 MG/ML injection    DEPO-PROVERA    1 mL    Inject 1 mL (150 mg) into the muscle every 3 months To be given in clinic    Encounter for surveillance of injectable contraceptive       * medroxyPROGESTERone 150 MG/ML injection    DEPO-PROVERA    1 mL    Inject 1 mL (150 mg) into the muscle every 3 months    Encounter for surveillance of  injectable contraceptive       * pantoprazole 40 MG EC tablet    PROTONIX    90 tablet    Take 1 tablet (40 mg) by mouth daily Take 30-60 minutes before a meal.  APPT NEEDED FOR FURTHER REFILLS    Gastroesophageal reflux disease without esophagitis       * pantoprazole 20 MG EC tablet    PROTONIX    30 tablet    Take by mouth 30-60 minutes before a meal.    Gastroesophageal reflux disease without esophagitis       vitamin D 2000 UNITS tablet     100 tablet    Take 2,000 Units by mouth 2 times daily    Encounter for surveillance of injectable contraceptive       * Notice:  This list has 4 medication(s) that are the same as other medications prescribed for you. Read the directions carefully, and ask your doctor or other care provider to review them with you.

## 2017-09-08 NOTE — PATIENT INSTRUCTIONS
Consider getting off depo  Read IUD handout  Use calcium and vit D supplementation  Decrease protonix use, decreased dose today  Follow up in 1 month to recheck  pepcid as needed  Come fasting for follow up      Preventive Health Recommendations  Female Ages 26 - 39  Yearly exam:   See your health care provider every year in order to    Review health changes.     Discuss preventive care.      Review your medicines if you your doctor has prescribed any.    Until age 30: Get a Pap test every three years (more often if you have had an abnormal result).    After age 30: Talk to your doctor about whether you should have a Pap test every 3 years or have a Pap test with HPV screening every 5 years.   You do not need a Pap test if your uterus was removed (hysterectomy) and you have not had cancer.  You should be tested each year for STDs (sexually transmitted diseases), if you're at risk.   Talk to your provider about how often to have your cholesterol checked.  If you are at risk for diabetes, you should have a diabetes test (fasting glucose).  Shots: Get a flu shot each year. Get a tetanus shot every 10 years.   Nutrition:     Eat at least 5 servings of fruits and vegetables each day.    Eat whole-grain bread, whole-wheat pasta and brown rice instead of white grains and rice.    Talk to your provider about Calcium and Vitamin D.     Lifestyle    Exercise at least 150 minutes a week (30 minutes a day, 5 days of the week). This will help you control your weight and prevent disease.    Limit alcohol to one drink per day.    No smoking.     Wear sunscreen to prevent skin cancer.    See your dentist every six months for an exam and cleaning.

## 2017-09-08 NOTE — NURSING NOTE
"Chief Complaint   Patient presents with     Physical       Initial /76 (BP Location: Left arm, Patient Position: Sitting, Cuff Size: Adult Regular)  Pulse 66  Temp 98  F (36.7  C) (Oral)  Ht 5' 4\" (1.626 m)  Wt 163 lb 6 oz (74.1 kg)  SpO2 98%  BMI 28.04 kg/m2 Estimated body mass index is 28.04 kg/(m^2) as calculated from the following:    Height as of this encounter: 5' 4\" (1.626 m).    Weight as of this encounter: 163 lb 6 oz (74.1 kg).  Medication Reconciliation: complete     Nela AIKEN, Certified Medical Assistant (AAMA)September 8, 2017 12:09 PM      "

## 2017-09-09 ASSESSMENT — ANXIETY QUESTIONNAIRES: GAD7 TOTAL SCORE: 3

## 2017-09-12 LAB
COPATH REPORT: NORMAL
PAP: NORMAL

## 2017-09-14 LAB
FINAL DIAGNOSIS: NORMAL
HPV HR 12 DNA CVX QL NAA+PROBE: NEGATIVE
HPV16 DNA SPEC QL NAA+PROBE: NEGATIVE
HPV18 DNA SPEC QL NAA+PROBE: NEGATIVE
SPECIMEN DESCRIPTION: NORMAL

## 2017-10-13 ENCOUNTER — OFFICE VISIT (OUTPATIENT)
Dept: FAMILY MEDICINE | Facility: CLINIC | Age: 37
End: 2017-10-13
Payer: COMMERCIAL

## 2017-10-13 VITALS
BODY MASS INDEX: 27.66 KG/M2 | SYSTOLIC BLOOD PRESSURE: 117 MMHG | HEART RATE: 72 BPM | TEMPERATURE: 98.2 F | DIASTOLIC BLOOD PRESSURE: 80 MMHG | HEIGHT: 64 IN | WEIGHT: 162 LBS

## 2017-10-13 DIAGNOSIS — Z13.29 SCREENING FOR THYROID DISORDER: ICD-10-CM

## 2017-10-13 DIAGNOSIS — Z13.1 SCREENING FOR DIABETES MELLITUS: ICD-10-CM

## 2017-10-13 DIAGNOSIS — F41.1 GAD (GENERALIZED ANXIETY DISORDER): ICD-10-CM

## 2017-10-13 DIAGNOSIS — Z13.220 LIPID SCREENING: ICD-10-CM

## 2017-10-13 DIAGNOSIS — Z80.3 FAMILY HISTORY OF BREAST CANCER IN MOTHER: Primary | ICD-10-CM

## 2017-10-13 DIAGNOSIS — K21.9 GASTROESOPHAGEAL REFLUX DISEASE WITHOUT ESOPHAGITIS: ICD-10-CM

## 2017-10-13 DIAGNOSIS — R53.83 OTHER FATIGUE: ICD-10-CM

## 2017-10-13 LAB — HGB BLD-MCNC: 13.3 G/DL (ref 11.7–15.7)

## 2017-10-13 PROCEDURE — 85018 HEMOGLOBIN: CPT | Performed by: FAMILY MEDICINE

## 2017-10-13 PROCEDURE — 82306 VITAMIN D 25 HYDROXY: CPT | Performed by: FAMILY MEDICINE

## 2017-10-13 PROCEDURE — 36415 COLL VENOUS BLD VENIPUNCTURE: CPT | Performed by: FAMILY MEDICINE

## 2017-10-13 PROCEDURE — 80053 COMPREHEN METABOLIC PANEL: CPT | Performed by: FAMILY MEDICINE

## 2017-10-13 PROCEDURE — 84443 ASSAY THYROID STIM HORMONE: CPT | Performed by: FAMILY MEDICINE

## 2017-10-13 PROCEDURE — 80061 LIPID PANEL: CPT | Performed by: FAMILY MEDICINE

## 2017-10-13 PROCEDURE — 99214 OFFICE O/P EST MOD 30 MIN: CPT | Performed by: FAMILY MEDICINE

## 2017-10-13 NOTE — LETTER
"[facility]  [address] [place] [id number] [phone]                                                                                                October 23, 2017    Mary Ma  [address] [facility] [address]        Dear Ms. Ma,    Your cholesterol is abnormal, please use the recommendations below and recheck labs in 6-12 months.     Ways to improve your cholesterol...     1- Eats less saturated fats (including avoiding \"trans\" fats).     2 - Eat more unsaturated fats  - found in vegetables, grains, and tree nuts.   Also by replacing butter with canola oil or olive oil.     3 - Eat more nuts.   1-2 ounces (a small handful) of almonds, walnuts, hazelnuts or pecans once a day in place of other less healthy snacks.     4 - Eat more high fiber foods - vegetables and whole grains including oat bran, oats, beans, peas, and flax seed.     5 - Eat more fish - such as salmon, tuna, mackerel, and sardines.  1 or 2 six ounce servings per week is a healthy replacement for other proteins.     6 - Exercise for at least 120 minutes per week - which is equal to 30 minutes 4 days per week.     Results for orders placed or performed in visit on 10/13/17   Lipid panel reflex to direct LDL   Result Value Ref Range    Cholesterol 163 <200 mg/dL    Triglycerides 75 <150 mg/dL    HDL Cholesterol 42 (L) >49 mg/dL    LDL Cholesterol Calculated 106 (H) <100 mg/dL    Non HDL Cholesterol 121 <130 mg/dL   TSH with free T4 reflex   Result Value Ref Range    TSH 1.10 0.40 - 4.00 mU/L   Hemoglobin   Result Value Ref Range    Hemoglobin 13.3 11.7 - 15.7 g/dL   Vitamin D Deficiency   Result Value Ref Range    Vitamin D Deficiency screening 24 20 - 75 ug/L   Comprehensive metabolic panel   Result Value Ref Range    Sodium 141 133 - 144 mmol/L    Potassium 4.1 3.4 - 5.3 mmol/L    Chloride 111 (H) 94 - 109 mmol/L    Carbon Dioxide 24 20 - 32 mmol/L    Anion Gap 6 3 - 14 mmol/L    Glucose 82 " 70 - 99 mg/dL    Urea Nitrogen 13 7 - 30 mg/dL    Creatinine 0.92 0.52 - 1.04 mg/dL    GFR Estimate 69 >60 mL/min/1.7m2    GFR Estimate If Black 84 >60 mL/min/1.7m2    Calcium 8.5 8.5 - 10.1 mg/dL    Bilirubin Total 0.6 0.2 - 1.3 mg/dL    Albumin 3.7 3.4 - 5.0 g/dL    Protein Total 7.3 6.8 - 8.8 g/dL    Alkaline Phosphatase 70 40 - 150 U/L    ALT 27 0 - 50 U/L    AST 12 0 - 45 U/L         Sincerely,      Alfonso Clay DO/mv

## 2017-10-13 NOTE — PATIENT INSTRUCTIONS
Get labs today  Talk to  about vasectomy  Follow up with genetic counseling  Get mammogram  Check your insurance  Lets plan on reviewing plan in one year but for now, continue on depo.  May need to get bone density  Alfonso Clay D.O.    [facility]   Discharged by : Nela AIKEN, Certified Medical Assistant (LUIGI)October 13, 2017 3:09 PM    Paper scripts provided to patient : none   If you have any questions regarding to your visit please contact your care team:   [facility] Clinic Hours Telephone Number   Dr. Nela Galvin   7am-7pm Monday - Thursday   7am-5pm Fridays  [phone]   (Appointment scheduling available 24/7)   RN Line   [phone] option 2     What options do I have for visits at the clinic other than the traditional office visit?   To expand how we care for you, many of our providers are utilizing electronic visits (e-visits) and telephone visits, when medically appropriate, for interactions with their patients rather than a visit in the clinic. We also offer nurse visits for many medical concerns. Just like any other service, we will bill your insurance company for this type of visit based on time spent on the phone with your provider. Not all insurance companies cover these visits. Please check with your medical insurance if this type of visit is covered. You will be responsible for any charges that are not paid by your insurance.   E-visits via [employer]: generally incur a $35.00 fee.   Telephone visits:   Time spent on the phone: *charged based on time that is spent on the phone in increments of 10 minutes. Estimated cost:   5-10 mins $30.00   11-20 mins. $59.00   21-30 mins. $85.00   Use [employer] (secure email communication and access to your chart) to send your primary care provider a message or make an appointment. Ask someone on your Team how to sign up for [employer].   For a Price Quote  for your services, please call our Consumer Price Line at [phone].   As always, Thank you for trusting us with your health care needs!                    [facility]:    Scheduling Appointments  [place] [place]  Call: [phone]    Archana Silverio, [facility]  Call: [phone]    [facility]  Call: [phone]    For Gastroenterology referrals  [facility], 4th Floor  [address] [place] [address]  Appointments: [phone]    Patient [employer] Services - Customer Service [place]  Office hours: Mon through Thurs 8 am to 4:30 pm, Friday 9 am to 4:30 pm  Phone:  [phone] OR Toll Free: [phone]      WHERE TO GO FOR CARE?    Clinic    Make an appointment if you:       Are sick (cold, cough, flu, sore throat, earache or in pain).       Have a small injury (sprain, small cut, burn or broken bone).       Need a physical exam, Pap smear, vaccine or prescription refill.       Have questions about your health or medicines.    To reach us:      Call [phone] ([phone]). Open 24 hours every day. (For counseling services, call [phone].)    Log into Referly at [email or web address]. (Visit [email or web address] to create an account.) Hospital emergency room    An emergency is a serious or life- threatening problem that must be treated right away.    Call [phone] or get to the hospital if you have:      Very bad or sudden:            - Chest pain or pressure         - Bleeding         - Head or belly pain         - Dizziness or trouble seeing, walking or                          Speaking      Problems breathing      Blood in your vomit or you are coughing up blood      A major injury (knocked out, loss of a finger or limb, rape, broken bone protruding from skin)    A mental health crisis. (Or call the Mental Health Crisis line at 1-655.808.5851 or Suicide Prevention Hotline at  9-549-372-4061.)    Open 24 hours every day. You don't need an appointment.     Urgent care    Visit urgent care for sickness or small injuries when the clinic is closed. You don't need an appointment. To check hours or find an urgent care near you, visit [email or web address]. Online care    Get online care from [email or web address] for more than 70 common problems, like colds, allergies and infections. Open 24 hours every day at: [email or web address]   Need help deciding?    For advice about where to be seen, you may call your clinic and ask to speak with a nurse. We're here for you 24 hours every day.         If you are deaf or hard of hearing, please let us know. We provide many free services including sign language interpreters, oral interpreters, TTYs, telephone amplifiers, note takers and written materials.

## 2017-10-13 NOTE — PROGRESS NOTES
SUBJECTIVE:   Mary Ma is a 36 year old female who presents to clinic today for the following health issues:      IUD consult  Patient reports heartburn issues have resolved.     3kids  On depo for 5 years, not been taking vit D and calcium    Family history of breast cancer -per patient mom had it in her 30's  And now has throat cancer  Aunt with cervical/fallopian cancer  She is done with kids will talk to her  regarding asectomy    The patient is interested in mirena IUD.  The patient meets and is agreeable to the following conditions:  She is parous.  She is not interested in conception in the near future.done  With kids  She currently is in a stable, monogamous relationship.Yes  There is no previous history of pelvic inflammatory disease.Yes  There is no previous history of ectopic pregnancy.Yes  She is willing to check monthly for the IUD string.Yes  She is at least 6 weeks post-partum.Yes  There is no history of unresolved abnormal uterine bleeding.Yes  There is no history of an unresolved abnormal PAP smear.Yes  She has no history of Junior's disease or an allergy to copper (for ParaGard).Yes  She has no history of diabetes, AIDS, leukemia, IV drug use or chronic steroid use.Yes  She is willing to return annually for PAP smears.Yes  She has had a PAP smear within the past 6 months.Yes  She denies the possibility of pregnancy.Yes  Pregnancy test today is negative.Yes    The following risks were discussed with the patient:  Possibility of pregnancy and ectopic pregnancy.Yes  Possibility of pelvic inflammatory disease, particularly with new partners.Yes  Risk of uterine perforation or IUD expulsion.Yes  Possibility of difficult removal.Yes  Spotting or heavy bleeding.      Cramping, pain or infection during or after insertion.    The patient was given patient information on the IUD and the patient education brochure from the .  This patient has completed her IUD consult and can be  scheduled for the placement procedure.    Heartburn resolved  Depression stable on current meds      Problem list and histories reviewed & adjusted, as indicated.  Additional history: as documented    Patient Active Problem List   Diagnosis     CARDIOVASCULAR SCREENING; LDL GOAL LESS THAN 160     Major depressive disorder, recurrent episode, mild (H)     Gastroesophageal reflux disease without esophagitis     Nausea     Other fatigue     Encounter for surveillance of injectable contraceptive     ARIELLA (generalized anxiety disorder)     Past Surgical History:   Procedure Laterality Date     ENT SURGERY  2002    sinus surgery     ESOPHAGOSCOPY, GASTROSCOPY, DUODENOSCOPY (EGD), COMBINED N/A 2/23/2016    Procedure: COMBINED ESOPHAGOSCOPY, GASTROSCOPY, DUODENOSCOPY (EGD);  Surgeon: Duane, William Charles, MD;  Location: [facility] OR     NO HISTORY OF SURGERY         Social History   Substance Use Topics     Smoking status: Former Smoker     Smokeless tobacco: Never Used      Comment: no 2nd hand smoke exposure     Alcohol use No     Family History   Problem Relation Age of Onset     DIABETES Father      Depression/Anxiety Father      DIABETES Maternal Grandmother      Breast Cancer Maternal Grandmother      CANCER Maternal Grandmother      Breast Cancer Mother      CEREBROVASCULAR DISEASE Mother      CANCER Mother      CANCER Paternal Grandfather      CANCER Maternal Half-Sister      fallopian tube     Ovarian Cancer Other      Hypertension No family hx of      Thyroid Disease No family hx of      Glaucoma No family hx of      Macular Degeneration No family hx of              Reviewed and updated as needed this visit by clinical staff       Reviewed and updated as needed this visit by Provider         ROS:  Constitutional, HEENT, cardiovascular, pulmonary, GI, , musculoskeletal, neuro, skin, endocrine and psych systems are negative, except as otherwise noted.      OBJECTIVE:   /80  Pulse 72  Temp 98.2  F (36.8  C)  "(Oral)  Ht 5' 4\" (1.626 m)  Wt 162 lb (73.5 kg)  BMI 27.81 kg/m2  Body mass index is 27.81 kg/(m^2).  GENERAL: healthy, alert and no distress  NECK: no adenopathy, no asymmetry, masses, or scars and thyroid normal to palpation  RESP: lungs clear to auscultation - no rales, rhonchi or wheezes  CV: regular rate and rhythm, normal S1 S2, no S3 or S4, no murmur, click or rub, no peripheral edema and peripheral pulses strong  ABDOMEN: soft, nontender, no hepatosplenomegaly, no masses and bowel sounds normal  MS: no gross musculoskeletal defects noted, no edema    Diagnostic Test Results:  Results for orders placed or performed in visit on 10/13/17   Hemoglobin   Result Value Ref Range    Hemoglobin 13.3 11.7 - 15.7 g/dL         ASSESSMENT/PLAN:       ICD-10-CM    1. Family history of breast cancer in mother Z80.3 MA Screening Digital Bilateral     CANCER RISK MGMT/CANCER GENETIC COUNSELING REFERRAL   2. Other fatigue R53.83 TSH with free T4 reflex     Hemoglobin     Vitamin D Deficiency     Comprehensive metabolic panel   3. Lipid screening Z13.220 Lipid panel reflex to direct LDL   4. Screening for diabetes mellitus Z13.1    5. Screening for thyroid disorder Z13.29 Comprehensive metabolic panel   6. ARIELLA (generalized anxiety disorder) F41.1 TSH with free T4 reflex     Comprehensive metabolic panel   7. Gastroesophageal reflux disease without esophagitis K21.9 Comprehensive metabolic panel       Get labs today  Talk to  about vasectomy  Follow up with genetic counseling  Get mammogram  Lets plan on reviewing plan in one year but for now, continue on depo. iud consult done  May need to get bone density  Depression stable  gerd-stable, cont monitoring        See Patient Instructions    Alfonso Clay DO  [facility]  "

## 2017-10-13 NOTE — MR AVS SNAPSHOT
After Visit Summary   10/13/2017    Mary Ma    MRN: [id number]           Patient Information     Date Of Birth          1980        Visit Information        Provider Department      10/13/2017 2:20 PM Alfonso Clay DO [facility]        Today's Diagnoses     Family history of breast cancer in mother    -  1    Other fatigue        Lipid screening        Screening for diabetes mellitus        Screening for thyroid disorder          Care Instructions    Get labs today  Talk to  about vasectomy  Follow up with genetic counseling  Get mammogram  Check your insurance  Lets plan on reviewing plan in one year but for now, continue on depo.  May need to get bone density  Alfonso Clay D.O.    [facility]   Discharged by : Nela AIKEN Certified Medical Assistant (AAMA)October 13, 2017 3:09 PM    Paper scripts provided to patient : none   If you have any questions regarding to your visit please contact your care team:   [facility] Clinic Hours Telephone Number   Dr. Nela Galvin   7am-7pm Monday - Thursday   7am-5pm Fridays  [phone]   (Appointment scheduling available 24/7)   RN Line   [phone] option 2     What options do I have for visits at the clinic other than the traditional office visit?   To expand how we care for you, many of our providers are utilizing electronic visits (e-visits) and telephone visits, when medically appropriate, for interactions with their patients rather than a visit in the clinic. We also offer nurse visits for many medical concerns. Just like any other service, we will bill your insurance company for this type of visit based on time spent on the phone with your provider. Not all insurance companies cover these visits. Please check with your medical insurance if this type of visit is covered. You will be responsible for any  charges that are not paid by your insurance.   E-visits via Sunseat: generally incur a $35.00 fee.   Telephone visits:   Time spent on the phone: *charged based on time that is spent on the phone in increments of 10 minutes. Estimated cost:   5-10 mins $30.00   11-20 mins. $59.00   21-30 mins. $85.00   Use Sunseat (secure email communication and access to your chart) to send your primary care provider a message or make an appointment. Ask someone on your Team how to sign up for Cloudant.   For a Price Quote for your services, please call our Consumer Price Line at [phone].   As always, Thank you for trusting us with your health care needs!                    [facility]:    Scheduling Appointments  [place] [place]  Call: [phone]    Archana Silverio [facility]  Call: [phone]    [facility]  Call: [phone]    For Gastroenterology referrals  [facility] Gastroenterology  Clinics and Surgery Center, 4th Floor  [address] [place] [id number]  Appointments: [phone]    Patient Financial Services - Customer Service [place]  Office hours: Mon through Thurs 8 am to 4:30 pm, Friday 9 am to 4:30 pm  Phone:  [phone] OR Toll Free: [phone]      WHERE TO GO FOR CARE?    Clinic    Make an appointment if you:       Are sick (cold, cough, flu, sore throat, earache or in pain).       Have a small injury (sprain, small cut, burn or broken bone).       Need a physical exam, Pap smear, vaccine or prescription refill.       Have questions about your health or medicines.    To reach us:      Call [phone] ([phone]). Open 24 hours every day. (For counseling services, call [phone].)    Log into Cloudant at [email or web address]. (Visit [email or web address] to create an account.) Hospital emergency room    An emergency is a serious or life- threatening problem that must be treated right away.    Call 612 or get to the  hospital if you have:      Very bad or sudden:            - Chest pain or pressure         - Bleeding         - Head or belly pain         - Dizziness or trouble seeing, walking or                          Speaking      Problems breathing      Blood in your vomit or you are coughing up blood      A major injury (knocked out, loss of a finger or limb, rape, broken bone protruding from skin)    A mental health crisis. (Or call the Mental Health Crisis line at 1-260.467.2130 or Suicide Prevention Hotline at 1-709.840.1753.)    Open 24 hours every day. You don't need an appointment.     Urgent care    Visit urgent care for sickness or small injuries when the clinic is closed. You don't need an appointment. To check hours or find an urgent care near you, visit [email or web address]. Online care    Get online care from [email or web address] for more than 70 common problems, like colds, allergies and infections. Open 24 hours every day at: [email or web address]   Need help deciding?    For advice about where to be seen, you may call your clinic and ask to speak with a nurse. We're here for you 24 hours every day.         If you are deaf or hard of hearing, please let us know. We provide many free services including sign language interpreters, oral interpreters, TTYs, telephone amplifiers, note takers and written materials.                 Follow-ups after your visit        Additional Services     CANCER RISK MGMT/CANCER GENETIC COUNSELING REFERRAL       Your provider has referred you to the Cancer Risk Management Program - Cancer Genetic Counseling.    Reason for Referral: aunt and mother with gyn cancers    We have a sent a notice to a staff member of the Cancer Risk Management Program to give you a call to assist with scheduling your appointment.  You may also call  [phone] ([phone]) to initiate scheduling.    Please be aware that coverage of these services is subject to the terms and limitations of your health  "insurance plan.  Call member services at your health plan with any benefit or coverage questions.      Please bring the completed family history sheet to your appointment in addition to any available outside medical records documenting your cancer diagnosis.                  Future tests that were ordered for you today     Open Future Orders        Priority Expected Expires Ordered    MA Screening Digital Bilateral Routine  10/13/2018 10/13/2017            Who to contact     If you have questions or need follow up information about today's clinic visit or your schedule please contact [facility] directly at [phone].  Normal or non-critical lab and imaging results will be communicated to you by NightstaRxhart, letter or phone within 4 business days after the clinic has received the results. If you do not hear from us within 7 days, please contact the clinic through Skemazt or phone. If you have a critical or abnormal lab result, we will notify you by phone as soon as possible.  Submit refill requests through [employer] or call your pharmacy and they will forward the refill request to us. Please allow 3 business days for your refill to be completed.          Additional Information About Your Visit        NightstaRxhart Information     [employer] gives you secure access to your electronic health record. If you see a primary care provider, you can also send messages to your care team and make appointments. If you have questions, please call your primary care clinic.  If you do not have a primary care provider, please call [phone] and they will assist you.        Care EveryWhere ID     This is your Care EveryWhere ID. This could be used by other organizations to access your [place] medical records  [id number]        Your Vitals Were     Pulse Temperature Height BMI (Body Mass Index)          72 98.2  F (36.8  C) (Oral) 5' 4\" (1.626 m) 27.81 kg/m2         Blood Pressure from Last 3 Encounters:   10/13/17 117/80 "   09/08/17 122/76   06/09/17 102/64    Weight from Last 3 Encounters:   10/13/17 162 lb (73.5 kg)   09/08/17 163 lb 6 oz (74.1 kg)   06/09/17 168 lb 3.2 oz (76.3 kg)              We Performed the Following     CANCER RISK MGMT/CANCER GENETIC COUNSELING REFERRAL        Primary Care Provider Office Phone # Fax #    Kristen M Kehr, PA-C [phone] [address] [facility] [address]        Equal Access to Services     [facility]: Hadii aad ku hadasho Soomaali, waaxda luqadaha, qaybta kaalmada adeegyada, waxay scottin hayaan donnie chin . So [facility] [phone].    ATENCIÓN: Si habla español, tiene a saenz disposición servicios gratuitos de asistencia lingüística. [facility] [phone].    We comply with applicable federal civil rights laws and [place] laws. We do not discriminate on the basis of race, color, national origin, age, disability, sex, sexual orientation, or gender identity.            Thank you!     Thank you for choosing [facility]  for your care. Our goal is always to provide you with excellent care. Hearing back from our patients is one way we can continue to improve our services. Please take a few minutes to complete the written survey that you may receive in the mail after your visit with us. Thank you!             Your Updated Medication List - Protect others around you: Learn how to safely use, store and throw away your medicines at www.disposemymeds.org.          This list is accurate as of: 10/13/17  3:09 PM.  Always use your most recent med list.                   Brand Name Dispense Instructions for use Diagnosis    escitalopram 20 MG tablet    LEXAPRO    90 tablet    Take 1 tablet (20 mg) by mouth daily APPT NEEDED FOR FURTHER REFILLS    Major depressive disorder, recurrent episode, mild (H)       famotidine 40 MG tablet    PEPCID    30 tablet    Take 1 tablet (40 mg) by mouth At Bedtime    Gastroesophageal reflux disease without esophagitis        medroxyPROGESTERone 150 MG/ML injection    DEPO-PROVERA    1 mL    Inject 1 mL (150 mg) into the muscle every 3 months    Encounter for surveillance of injectable contraceptive       * pantoprazole 40 MG EC tablet    PROTONIX    90 tablet    Take 1 tablet (40 mg) by mouth daily Take 30-60 minutes before a meal.  APPT NEEDED FOR FURTHER REFILLS    Gastroesophageal reflux disease without esophagitis       * pantoprazole 20 MG EC tablet    PROTONIX    30 tablet    Take by mouth 30-60 minutes before a meal.    Gastroesophageal reflux disease without esophagitis       vitamin D 2000 UNITS tablet     100 tablet    Take 2,000 Units by mouth 2 times daily    Encounter for surveillance of injectable contraceptive       * Notice:  This list has 2 medication(s) that are the same as other medications prescribed for you. Read the directions carefully, and ask your doctor or other care provider to review them with you.

## 2017-10-13 NOTE — NURSING NOTE
"Chief Complaint   Patient presents with     Consult     IUD     Follow Up For     heartburn       Initial /80  Pulse 72  Temp 98.2  F (36.8  C) (Oral)  Ht 5' 4\" (1.626 m)  Wt 162 lb (73.5 kg)  BMI 27.81 kg/m2 Estimated body mass index is 27.81 kg/(m^2) as calculated from the following:    Height as of this encounter: 5' 4\" (1.626 m).    Weight as of this encounter: 162 lb (73.5 kg).  Medication Reconciliation: complete   Caty Salvador MA      "

## 2017-10-14 LAB
ALBUMIN SERPL-MCNC: 3.7 G/DL (ref 3.4–5)
ALP SERPL-CCNC: 70 U/L (ref 40–150)
ALT SERPL W P-5'-P-CCNC: 27 U/L (ref 0–50)
ANION GAP SERPL CALCULATED.3IONS-SCNC: 6 MMOL/L (ref 3–14)
AST SERPL W P-5'-P-CCNC: 12 U/L (ref 0–45)
BILIRUB SERPL-MCNC: 0.6 MG/DL (ref 0.2–1.3)
BUN SERPL-MCNC: 13 MG/DL (ref 7–30)
CALCIUM SERPL-MCNC: 8.5 MG/DL (ref 8.5–10.1)
CHLORIDE SERPL-SCNC: 111 MMOL/L (ref 94–109)
CHOLEST SERPL-MCNC: 163 MG/DL
CO2 SERPL-SCNC: 24 MMOL/L (ref 20–32)
CREAT SERPL-MCNC: 0.92 MG/DL (ref 0.52–1.04)
GFR SERPL CREATININE-BSD FRML MDRD: 69 ML/MIN/1.7M2
GLUCOSE SERPL-MCNC: 82 MG/DL (ref 70–99)
HDLC SERPL-MCNC: 42 MG/DL
LDLC SERPL CALC-MCNC: 106 MG/DL
NONHDLC SERPL-MCNC: 121 MG/DL
POTASSIUM SERPL-SCNC: 4.1 MMOL/L (ref 3.4–5.3)
PROT SERPL-MCNC: 7.3 G/DL (ref 6.8–8.8)
SODIUM SERPL-SCNC: 141 MMOL/L (ref 133–144)
TRIGL SERPL-MCNC: 75 MG/DL
TSH SERPL DL<=0.005 MIU/L-ACNC: 1.1 MU/L (ref 0.4–4)

## 2017-10-15 LAB — DEPRECATED CALCIDIOL+CALCIFEROL SERPL-MC: 24 UG/L (ref 20–75)

## 2017-10-18 ENCOUNTER — TRANSFERRED RECORDS (OUTPATIENT)
Dept: HEALTH INFORMATION MANAGEMENT | Facility: CLINIC | Age: 37
End: 2017-10-18

## 2017-10-22 NOTE — PROGRESS NOTES
"Your cholesterol is abnormal, please use the recommendations below and recheck labs in 6-12 months.    Ways to improve your cholesterol...    1- Eats less saturated fats (including avoiding \"trans\" fats).    2 - Eat more unsaturated fats  - found in vegetables, grains, and tree nuts.   Also by replacing butter with canola oil or olive oil.    3 - Eat more nuts.   1-2 ounces (a small handful) of almonds, walnuts, hazelnuts or pecans once a  day in place of other less healthy snacks.    4 - Eat more high fiber foods - vegetables and whole grains including oat bran, oats, beans, peas, and flax seed.    5 - Eat more fish - such as salmon, tuna, mackerel, and sardines.  1 or 2 six ounce servings per week is a healthy replacement for other proteins.    6 - Exercise for at least 120 minutes per week - which is equal to 30 minutes 4 days per week.    Alfonso Clay D.O.  "

## 2017-10-27 ENCOUNTER — RADIANT APPOINTMENT (OUTPATIENT)
Dept: MAMMOGRAPHY | Facility: CLINIC | Age: 37
End: 2017-10-27
Attending: FAMILY MEDICINE
Payer: COMMERCIAL

## 2017-10-27 DIAGNOSIS — Z12.31 VISIT FOR SCREENING MAMMOGRAM: ICD-10-CM

## 2017-10-27 PROCEDURE — 77063 BREAST TOMOSYNTHESIS BI: CPT | Mod: TC

## 2017-10-27 PROCEDURE — G0202 SCR MAMMO BI INCL CAD: HCPCS | Mod: TC

## 2017-10-29 NOTE — PROGRESS NOTES
Mammo results managed by the breast center. Results communicated to the patient by their office.   Alfonso Clay D.O.

## 2019-06-27 ENCOUNTER — OFFICE VISIT (OUTPATIENT)
Dept: OPTOMETRY | Facility: CLINIC | Age: 39
End: 2019-06-27
Payer: COMMERCIAL

## 2019-06-27 DIAGNOSIS — H52.223 REGULAR ASTIGMATISM OF BOTH EYES: ICD-10-CM

## 2019-06-27 DIAGNOSIS — H52.03 HYPERMETROPIA, BILATERAL: ICD-10-CM

## 2019-06-27 DIAGNOSIS — Z01.00 ENCOUNTER FOR EXAMINATION OF EYES AND VISION WITHOUT ABNORMAL FINDINGS: Primary | ICD-10-CM

## 2019-06-27 PROCEDURE — 92014 COMPRE OPH EXAM EST PT 1/>: CPT | Performed by: OPTOMETRIST

## 2019-06-27 PROCEDURE — 92015 DETERMINE REFRACTIVE STATE: CPT | Performed by: OPTOMETRIST

## 2019-06-27 RX ORDER — ESCITALOPRAM OXALATE 20 MG/1
20 TABLET ORAL DAILY
COMMUNITY

## 2019-06-27 ASSESSMENT — VISUAL ACUITY
OD_CC: 20/60
OS_SC+: -1
OS_CC: 20/25
OD_CC+: -1
OS_CC+: -2
METHOD: SNELLEN - LINEAR
OS_SC: 20/120
CORRECTION_TYPE: GLASSES
OS_SC: 20/60
OS_CC: 20/60
OD_CC: 20/20
OD_SC: 20/60
OD_SC: 20/40

## 2019-06-27 ASSESSMENT — REFRACTION_MANIFEST
OD_AXIS: 023
OD_SPHERE: +1.75
OS_SPHERE: +3.00
OS_AXIS: 140
OS_CYLINDER: +2.00
OD_CYLINDER: +1.00

## 2019-06-27 ASSESSMENT — REFRACTION_WEARINGRX
OS_AXIS: 130
OD_AXIS: 022
SPECS_TYPE: SVL
OD_CYLINDER: +0.75
OS_SPHERE: +2.25
OD_SPHERE: +1.75
OS_CYLINDER: +1.75

## 2019-06-27 ASSESSMENT — TONOMETRY
OD_IOP_MMHG: 18
OS_IOP_MMHG: 16
IOP_METHOD: APPLANATION

## 2019-06-27 ASSESSMENT — CONF VISUAL FIELD
OS_NORMAL: 1
OD_NORMAL: 1

## 2019-06-27 ASSESSMENT — EXTERNAL EXAM - RIGHT EYE: OD_EXAM: NORMAL

## 2019-06-27 ASSESSMENT — CUP TO DISC RATIO
OS_RATIO: 0.15
OD_RATIO: 0.15

## 2019-06-27 ASSESSMENT — EXTERNAL EXAM - LEFT EYE: OS_EXAM: NORMAL

## 2019-06-27 ASSESSMENT — SLIT LAMP EXAM - LIDS
COMMENTS: NORMAL
COMMENTS: NORMAL

## 2019-06-27 NOTE — LETTER
6/27/2019         RE: Mary Ma  [address] [facility] [address]        Dear Colleague,    Thank you for referring your patient, Mary Ma, to the [facility]. Please see a copy of my visit note below.    Chief Complaint   Patient presents with     Annual Eye Exam      Accompanied by self  Last Eye Exam: 3 years ago  Dilated Previously: Yes    What are you currently using to see?  Glasses-3 years old       Distance Vision Acuity: Noticed gradual change in both eyes    Near Vision Acuity: Satisfied with vision while reading  with glasses    Eye Comfort: good  Do you use eye drops? 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, Optometric Tech          Medical, surgical and family histories reviewed and updated 6/27/2019.       OBJECTIVE: See Ophthalmology exam    ASSESSMENT:    ICD-10-CM    1. Encounter for examination of eyes and vision without abnormal findings Z01.00 EYE EXAM (SIMPLE-NONBILLABLE)   2. Hypermetropia, bilateral H52.03 EYE EXAM (SIMPLE-NONBILLABLE)     REFRACTION   3. Regular astigmatism of both eyes H52.223 EYE EXAM (SIMPLE-NONBILLABLE)     REFRACTION      PLAN:     Patient Instructions   Mary was advised of today's exam findings.  Fill glasses prescription  Allow 2 weeks to adapt to change in glasses  Wear glasses full time  Copy of glasses Rx provided today.  Return in 2 years for eye exam, or sooner if needed.    The effects of the dilating drops last for 4- 6 hours.  You will be more sensitive to light and vision will be blurry up close.  Mydriatic sunglasses were given if needed.      Mao Wall O.D.  [facility]  [address] [place] [address]. [place]  [address]    [phone]           Again, thank you for allowing me to participate in the care of your patient.        Sincerely,        Mao Wall, GAURAV

## 2019-06-27 NOTE — PROGRESS NOTES
Chief Complaint   Patient presents with     Annual Eye Exam      Accompanied by self  Last Eye Exam: 3 years ago  Dilated Previously: Yes    What are you currently using to see?  Glasses-3 years old       Distance Vision Acuity: Noticed gradual change in both eyes    Near Vision Acuity: Satisfied with vision while reading  with glasses    Eye Comfort: good  Do you use eye drops? 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       OBJECTIVE: See Ophthalmology exam    ASSESSMENT:    ICD-10-CM    1. Encounter for examination of eyes and vision without abnormal findings Z01.00 EYE EXAM (SIMPLE-NONBILLABLE)   2. Hypermetropia, bilateral H52.03 EYE EXAM (SIMPLE-NONBILLABLE)     REFRACTION   3. Regular astigmatism of both eyes H52.223 EYE EXAM (SIMPLE-NONBILLABLE)     REFRACTION      PLAN:     Patient Instructions   Mary was advised of today's exam findings.  Fill glasses prescription  Allow 2 weeks to adapt to change in glasses  Wear glasses full time  Copy of glasses Rx provided today.  Return in 2 years for eye exam, or sooner if needed.    The effects of the dilating drops last for 4- 6 hours.  You will be more sensitive to light and vision will be blurry up close.  Mydriatic sunglasses were given if needed.      Mao Wall O.D.  [facility]  [address] [place] [address]. [place]  [address]    [phone]

## 2019-06-27 NOTE — PATIENT INSTRUCTIONS
Mary was advised of today's exam findings.  Fill glasses prescription  Allow 2 weeks to adapt to change in glasses  Wear glasses full time  Copy of glasses Rx provided today.  Return in 2 years for eye exam, or sooner if needed.    The effects of the dilating drops last for 4- 6 hours.  You will be more sensitive to light and vision will be blurry up close.  Mydriatic sunglasses were given if needed.      Mao Wall O.D.  [facility]  [place]  [address]    [phone]

## 2020-01-08 NOTE — PROGRESS NOTES
SUBJECTIVE:                                                    Mary Ma is a 36 year old female who presents to clinic today for the following health issues:  She will need refills of lexapro today. She has been doing well on this medication.     Depression Followup    Status since last visit: Stable     See PHQ-9 for current symptoms.  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 Felt like heart was racing. Patient left work and heart rate at home was 102.  Patient had eaten and had lots of water and had around 20oz of caffeine.  She did not sleep well prior to her work day. She went home from work and slept and feels good today. She has not had symptoms since. She denies dizziness, palpitations or chest pressure / pain. She typically does not drink caffeine. The job is new within the past month. She is more active and has actually lost a few pounds from the increased activity. She denies URI symptoms or congestion. No history of fainting or syncope in the past.     On depo, has not received period but went to wipe and their was some blood a couple of weeks ago.  She also has questions about recent vaginal spotting. She noticed blood when wiping after going to the bathroom, this has not been an issue on a regular basis.   No cramping / pain and no other vaginal discharge.       Problem list and histories reviewed & adjusted, as indicated.  Additional history: as documented    Patient Active Problem List  He has a mild EKG abnormality.  All of the findings can be considered a normal variant and should not be cause for concern.  His cardiac evaluation is normal.    There are no restrictions on his medications from a cardiac standpoint.     Diagnosis     CARDIOVASCULAR SCREENING; LDL GOAL LESS THAN 160     Major depressive disorder, recurrent episode, mild (H)     Gastroesophageal reflux disease without esophagitis     Nausea     Other fatigue     Encounter for surveillance of injectable contraceptive     ARIELLA (generalized anxiety disorder)     Past Surgical History   Procedure Laterality Date     No history of surgery       Ent surgery  2002     sinus surgery     Esophagoscopy, gastroscopy, duodenoscopy (egd), combined N/A 2/23/2016     Procedure: COMBINED ESOPHAGOSCOPY, GASTROSCOPY, DUODENOSCOPY (EGD);  Surgeon: Duane, William Charles, MD;  Location:  OR       Social History   Substance Use Topics     Smoking status: Former Smoker     Smokeless tobacco: Never Used      Comment: no 2nd hand smoke exposure     Alcohol Use: No     Family History   Problem Relation Age of Onset     DIABETES Father      Depression/Anxiety Father      DIABETES Maternal Grandmother      Breast Cancer Maternal Grandmother      CANCER Maternal Grandmother      Breast Cancer Mother      CEREBROVASCULAR DISEASE Mother      CANCER Mother      Ovarian Cancer Other      CANCER Paternal Grandfather      Hypertension No family hx of      Thyroid Disease No family hx of      Glaucoma No family hx of      Macular Degeneration No family hx of      CANCER Maternal Half-Sister      fallopian tube         Current Outpatient Prescriptions   Medication Sig Dispense Refill     escitalopram (LEXAPRO) 20 MG tablet Take 1 tablet (20 mg) by mouth daily APPT NEEDED FOR FURTHER REFILLS 90 tablet 1     pantoprazole (PROTONIX) 40 MG EC tablet Take 1 tablet (40 mg) by mouth daily Take 30-60 minutes before a meal.  APPT NEEDED FOR FURTHER REFILLS 90 tablet 0     medroxyPROGESTERone (DEPO-PROVERA) 150 MG/ML injection Inject 1 mL (150 mg) into the muscle every 3 months To be given in clinic 1 mL 3     [DISCONTINUED] escitalopram (LEXAPRO) 20 MG tablet Take 1 tablet (20 mg) by mouth daily APPT NEEDED  "FOR FURTHER REFILLS 30 tablet 0     Allergies   Allergen Reactions     Ceftin      Cefzil [Cefprozil]      Levaquin        ROS:  CONSTITUTIONAL:NEGATIVE for fever, chills, she has lost a few pounds  INTEGUMENTARY/SKIN: NEGATIVE for worrisome rashes, moles or lesions  EYES: NEGATIVE for vision changes or irritation  ENT/MOUTH: NEGATIVE for ear, mouth and throat problems  RESP:NEGATIVE for significant cough or SOB  CV: as above  GI: NEGATIVE for nausea, abdominal pain, heartburn, or change in bowel habits  : negative for, dysparunia, dysuria, hematuria, vaginal discharge. She did have the vaginal spotting as above  MUSCULOSKELETAL: NEGATIVE for significant arthralgias or myalgia  PSYCHIATRIC: NEGATIVE for changes in mood or affect    OBJECTIVE:                                                    /79 mmHg  Pulse 81  Ht 5' 4\" (1.626 m)  Wt 174 lb (78.926 kg)  BMI 29.85 kg/m2  SpO2 99%  Body mass index is 29.85 kg/(m^2).  GENERAL: healthy, alert and no distress  EYES: Eyes grossly normal to inspection, PERRL and conjunctivae and sclerae normal  HENT: ear canals and TM's normal, nose and mouth without ulcers or lesions  NECK: no adenopathy, no asymmetry, masses, or scars and thyroid normal to palpation  RESP: lungs clear to auscultation - no rales, rhonchi or wheezes  CV: regular rate and rhythm, normal S1 S2, no S3 or S4, no murmur, click or rub, no peripheral edema and peripheral pulses strong  MS: no gross musculoskeletal defects noted, no edema  SKIN: no suspicious lesions or rashes  PSYCH: mentation appears normal, anxious, judgement and insight intact and appearance well groomed    Diagnostic Test Results:  pending     ASSESSMENT/PLAN:                                                    1. Major depressive disorder, recurrent episode, mild (H)  Stable, continue with the Lexapro.   Refills given x 6 months.   - escitalopram (LEXAPRO) 20 MG tablet; Take 1 tablet (20 mg) by mouth daily APPT NEEDED FOR " FURTHER REFILLS  Dispense: 90 tablet; Refill: 1    2. Heart palpitations  Check the following tests.   Asymptomatic today. Reassurance that her exam is normal. Avoid caffeine or any OTC decongestants. She is working on getting adequate rest since lack of sleep may have contributed also.   Continue to monitor for now since this is an isolated incident and no further symptoms.   - Renal panel  - TSH with free T4 reflex  - CBC with platelets    3. Breakthrough bleeding on depo provera  Reassurance given.       Kristen M. Kehr, PA-C  [facility]

## 2020-02-24 ENCOUNTER — HEALTH MAINTENANCE LETTER (OUTPATIENT)
Age: 40
End: 2020-02-24

## 2020-12-13 ENCOUNTER — HEALTH MAINTENANCE LETTER (OUTPATIENT)
Age: 40
End: 2020-12-13

## 2021-04-17 ENCOUNTER — HEALTH MAINTENANCE LETTER (OUTPATIENT)
Age: 41
End: 2021-04-17

## 2021-05-27 ENCOUNTER — HOSPITAL ENCOUNTER (EMERGENCY)
Dept: EMERGENCY MEDICINE | Facility: HOSPITAL | Age: 41
Discharge: LEFT BEFORE DISCHARGE | End: 2021-05-27
Attending: EMERGENCY MEDICINE
Payer: COMMERCIAL

## 2021-05-27 ASSESSMENT — MIFFLIN-ST. JEOR: SCORE: 1490.55

## 2021-05-30 VITALS — WEIGHT: 174 LBS | BODY MASS INDEX: 29.71 KG/M2 | HEIGHT: 64 IN

## 2021-06-10 NOTE — PROGRESS NOTES
Assessment/Plan:        Diagnoses and all orders for this visit:    Urinary calculus, unspecified  -     POCT urinalysis dipstick-[facility]  -     Symptom Control While Passing a Stone Education  -     CT Abdomen Pelvis Without Oral Without IV Contrast; Future; Expected date: 5/5/17  -     tamsulosin (FLOMAX) 0.4 mg Cp24; Take 1 capsule (0.4 mg total) by mouth daily for 14 days.  Dispense: 14 capsule; Refill: 1  -     ondansetron (ZOFRAN-ODT) 4 MG disintegrating tablet; Take 1 tablet (4 mg total) by mouth every 6 (six) hours as needed for nausea.  Dispense: 10 tablet; Refill: 1  -     oxyCODONE (ROXICODONE) 5 MG immediate release tablet; Take 1-2 tablets (5-10 mg total) by mouth every 4 (four) hours as needed for pain.  Dispense: 40 tablet; Refill: 0  -     dimenhyDRINATE (DRAMAMINE) 50 MG tablet; Take 1 tablet (50 mg total) by mouth every 6 (six) hours as needed.  Dispense: 12 tablet; Refill: 0  -     ibuprofen (ADVIL) 200 MG tablet; Take 2 tablets (400 mg total) by mouth every 6 (six) hours as needed for pain.  Dispense: 30 tablet; Refill: 3    Calculus of ureter  -     Symptom Control While Passing a Stone Education  -     CT Abdomen Pelvis Without Oral Without IV Contrast; Future; Expected date: 5/5/17  -     tamsulosin (FLOMAX) 0.4 mg Cp24; Take 1 capsule (0.4 mg total) by mouth daily for 14 days.  Dispense: 14 capsule; Refill: 1  -     ondansetron (ZOFRAN-ODT) 4 MG disintegrating tablet; Take 1 tablet (4 mg total) by mouth every 6 (six) hours as needed for nausea.  Dispense: 10 tablet; Refill: 1  -     oxyCODONE (ROXICODONE) 5 MG immediate release tablet; Take 1-2 tablets (5-10 mg total) by mouth every 4 (four) hours as needed for pain.  Dispense: 40 tablet; Refill: 0  -     dimenhyDRINATE (DRAMAMINE) 50 MG tablet; Take 1 tablet (50 mg total) by mouth every 6 (six) hours as needed.  Dispense: 12 tablet; Refill: 0  -     ibuprofen (ADVIL) 200 MG tablet; Take 2 tablets (400 mg total) by mouth every 6 (six)  hours as needed for pain.  Dispense: 30 tablet; Refill: 3    Renal colic  -     Symptom Control While Passing a Stone Education  -     CT Abdomen Pelvis Without Oral Without IV Contrast; Future; Expected date: 5/5/17  -     tamsulosin (FLOMAX) 0.4 mg Cp24; Take 1 capsule (0.4 mg total) by mouth daily for 14 days.  Dispense: 14 capsule; Refill: 1  -     ondansetron (ZOFRAN-ODT) 4 MG disintegrating tablet; Take 1 tablet (4 mg total) by mouth every 6 (six) hours as needed for nausea.  Dispense: 10 tablet; Refill: 1  -     oxyCODONE (ROXICODONE) 5 MG immediate release tablet; Take 1-2 tablets (5-10 mg total) by mouth every 4 (four) hours as needed for pain.  Dispense: 40 tablet; Refill: 0  -     dimenhyDRINATE (DRAMAMINE) 50 MG tablet; Take 1 tablet (50 mg total) by mouth every 6 (six) hours as needed.  Dispense: 12 tablet; Refill: 0  -     ibuprofen (ADVIL) 200 MG tablet; Take 2 tablets (400 mg total) by mouth every 6 (six) hours as needed for pain.  Dispense: 30 tablet; Refill: 3    Hydronephrosis with urinary obstruction due to ureteral calculus  -     Symptom Control While Passing a Stone Education  -     CT Abdomen Pelvis Without Oral Without IV Contrast; Future; Expected date: 5/5/17  -     tamsulosin (FLOMAX) 0.4 mg Cp24; Take 1 capsule (0.4 mg total) by mouth daily for 14 days.  Dispense: 14 capsule; Refill: 1  -     ondansetron (ZOFRAN-ODT) 4 MG disintegrating tablet; Take 1 tablet (4 mg total) by mouth every 6 (six) hours as needed for nausea.  Dispense: 10 tablet; Refill: 1  -     oxyCODONE (ROXICODONE) 5 MG immediate release tablet; Take 1-2 tablets (5-10 mg total) by mouth every 4 (four) hours as needed for pain.  Dispense: 40 tablet; Refill: 0  -     dimenhyDRINATE (DRAMAMINE) 50 MG tablet; Take 1 tablet (50 mg total) by mouth every 6 (six) hours as needed.  Dispense: 12 tablet; Refill: 0  -     ibuprofen (ADVIL) 200 MG tablet; Take 2 tablets (400 mg total) by mouth every 6 (six) hours as needed for pain.   Dispense: 30 tablet; Refill: 3    Other orders  -     ketorolac injection 60 mg (TORADOL); Inject 2 mL (60 mg total) into the shoulder, thigh, or buttocks once.  -     HYDROmorphone tablet 4 mg (DILAUDID); Take 2 tablets (4 mg total) by mouth once.  -     ondansetron disintegrating tablet 4 mg (ZOFRAN-ODT); Take 1 tablet (4 mg total) by mouth once.  -     naloxone injection 0.2-0.4 mg (NARCAN); Inject 0.5-1 mL (0.2-0.4 mg total) into the shoulder, thigh, or buttocks as needed for opioid reversal or respiratory depression (Give if respiratory rate less than 8 and difficult to arouse).  -     ketorolac (TORADOL) 60 mg/2 mL injection;   -     HYDROmorphone (DILAUDID) 2 MG tablet;   -     ondansetron (ZOFRAN-ODT) 4 MG disintegrating tablet;       Stone Management Plan  [facility] 4/21/2017   Urinary Tract Infection No suspicion of infection   Renal Colic Well controlled symptoms   Renal Failure No suspicion of renal failure   Current CT date 4/21/2017   Right sided stones? No   R Stone Event No current event   Left sided stones? Yes   L Number of ureteral stones 1   L GSD of ureteral stones 3   L Location of ureteral stone Distal   L Number of kidney stones  No renal stones   L Stone Event New event   Diagnosis date 4/21/2017   Initial location of primary symptomatic stone Distal   Initial GSD of primary symptomatic stone 3   L MET Status Initiation   L Current Plan MET   MET 2 week F/U             Subjective:      HPI  Ms. Mary Ma is a 36 y.o.  female presenting to the [facility] with history of onset of severe left flank pain associated with nausea and vomiting this morning early.  She presented to the emergency room where she was found to have  RBCs per high-power field sodium 142 potassium 3.6 creatinine 0.93 calcium 9.2 white blood count 5700 hemoglobin 13.9.  Pain was controlled in the emergency room and CT scan obtained.    Personal review of CT scan with IV  contrast   demonstrates a 3 mm stone at the left UVJ.  There are no other stones seen.  There is mild hydroureteronephrosis on the left.    Patient's pain crescendoed one in the office and she was given Toradol 60 mg IM along with 4 mg of Dilaudid orally and 4 mg of Zofran.  Her pain was subsequently well controlled.    This is patient's first stone and she has a negative family history for stones.    Impression left distal ureteral stone pain now well controlled    Plan medical expulsive therapy.  Will start patient on Flomax 0.4 daily ibuprofen 400 every 6 and as needed meds including Zofran for nausea oxycodone for pain and Dramamine for bedtime use.  She will follow-up in 2 weeks with CT scan or in the interval if she has difficulties.      ROS   Review of Systems  A 12 point comprehensive review of systems is negative except for HPI    Past Medical History:   Diagnosis Date     Anxiety      GERD (gastroesophageal reflux disease)      Kidney stone     at age 36       No past surgical history on file.    Current Outpatient Prescriptions   Medication Sig Dispense Refill     escitalopram oxalate (LEXAPRO) 20 MG tablet TK ONE T PO D  1     pantoprazole (PROTONIX) 40 MG tablet   0     dimenhyDRINATE (DRAMAMINE) 50 MG tablet Take 1 tablet (50 mg total) by mouth at bedtime as needed (nausea and pain). 5 tablet 0     dimenhyDRINATE (DRAMAMINE) 50 MG tablet Take 1 tablet (50 mg total) by mouth every 6 (six) hours as needed. 12 tablet 0     ibuprofen (ADVIL) 200 MG tablet Take 2 tablets (400 mg total) by mouth every 6 (six) hours as needed for pain. 30 tablet 3     ibuprofen (ADVIL,MOTRIN) 200 MG tablet Take 1 tablet (200 mg total) by mouth every 6 (six) hours as needed for pain. 30 tablet 0     ondansetron (ZOFRAN ODT) 4 MG disintegrating tablet Take 1 tablet (4 mg total) by mouth every 6 (six) hours as needed for nausea. 20 tablet 0     ondansetron (ZOFRAN-ODT) 4 MG disintegrating tablet Take 1 tablet (4 mg total) by  mouth every 6 (six) hours as needed for nausea. 10 tablet 1     oxyCODONE (ROXICODONE) 5 MG immediate release tablet Take 1-2 tablets (5-10 mg total) by mouth every 4 (four) hours as needed for pain. 40 tablet 0     oxyCODONE-acetaminophen (PERCOCET) 5-325 mg per tablet Take 1-2 tablets by mouth every 6 (six) hours as needed for pain. 20 tablet 0     tamsulosin (FLOMAX) 0.4 mg Cp24 Take 1 capsule (0.4 mg total) by mouth daily for 14 days. 14 capsule 1     Current Facility-Administered Medications   Medication Dose Route Frequency Provider Last Rate Last Dose     HYDROmorphone (DILAUDID) 2 MG tablet              ketorolac (TORADOL) 60 mg/2 mL injection              naloxone injection 0.2-0.4 mg (NARCAN)  0.2-0.4 mg Intramuscular PRN Neal Fitch MD         ondansetron (ZOFRAN-ODT) 4 MG disintegrating tablet                Allergies   Allergen Reactions     Cefzil [Cefprozil]        Social History     Social History     Marital status:      Spouse name: N/A     Number of children: N/A     Years of education: N/A     Occupational History     packing       Social History Main Topics     Smoking status: Former Smoker     Smokeless tobacco: Never Used     Alcohol use Yes      Comment: very rare     Drug use: No     Sexual activity: Not on file     Other Topics Concern     Not on file     Social History Narrative     No narrative on file       Family History   Problem Relation Age of Onset     Cancer Mother      breast     Diabetes Father      Heart disease Paternal Uncle      heart attack       Objective:      Physical Exam  Vitals:    04/21/17 1427   BP: 118/63   Pulse: 85   Temp: 98  F (36.7  C)     General - well developed, well nourished, appropriate for age. Appears moderately uncomfortable   Heart - regular rate and rhythm, no murmur  Respiratory - normal effort, clear to auscultation, good air entry without adventitious noises  Abdomen - mildly obese soft, non-tender, no hepatosplenomegaly, no  masses.   - left flank  mild tenderness, no suprapubic tenderness, kidney and bladder non-palpable  MSK - normal spinal curvature. no spinal tenderness. normal gait. muscular strength intact.  Neurology - cranial nerves II-XII grossly intact, normal sensation, no unsteadiness  Skin - intact, no bruising, no gouty tophi  Psych - oriented to time, place, and person, normal mood and affect.      Labs  Urinalysis POC (Office):  Blood UA   Date Value Ref Range Status   04/21/2017 Trace Negative Final     Nitrite, UA   Date Value Ref Range Status   04/21/2017 Negative Negative Final   04/21/2017 Negative Negative Final     Leukocytes, UA    Date Value Ref Range Status   04/21/2017 Negative Negative Final     pH UA   Date Value Ref Range Status   04/21/2017 7.0 4.5 - 8.0 Final       Lab Urinalysis:  Blood, UA   Date Value Ref Range Status   04/21/2017 Moderate (!) Negative Final     Nitrite, UA   Date Value Ref Range Status   04/21/2017 Negative Negative Final   04/21/2017 Negative Negative Final     Leukocytes, UA   Date Value Ref Range Status   04/21/2017 Negative Negative Final     pH, UA   Date Value Ref Range Status   04/21/2017 6.0 4.5 - 8.0 Final    and Acute Labs   CBC   WBC   Date Value Ref Range Status   04/21/2017 5.1 4.0 - 11.0 thou/uL Final     Hemoglobin   Date Value Ref Range Status   04/21/2017 13.9 12.0 - 16.0 g/dL Final     Platelets   Date Value Ref Range Status   04/21/2017 147 140 - 440 thou/uL Final    and Renal Panel  [facility]  Creatinine   Date Value Ref Range Status   04/21/2017 0.93 0.60 - 1.10 mg/dL Final     Potassium   Date Value Ref Range Status   04/21/2017 3.6 3.5 - 5.0 mmol/L Final     Calcium   Date Value Ref Range Status   04/21/2017 9.2 8.5 - 10.5 mg/dL Final

## 2021-06-25 NOTE — ED NOTES
Called for patient in the lobby for blood draw and Tylenol. Patient is not in the lobby at this time.

## 2021-06-25 NOTE — ED TRIAGE NOTES
"Pt arrives per medics received her 2nd moderna vaccine. She was seen by her PMD for a scheduled physical. She called the medics because she has a \"burning sensation\" throughout her body, N/V x 4 and she reports chills.  "

## 2021-07-06 VITALS — HEIGHT: 64 IN | BODY MASS INDEX: 31.07 KG/M2 | WEIGHT: 182 LBS

## 2021-09-26 ENCOUNTER — HEALTH MAINTENANCE LETTER (OUTPATIENT)
Age: 41
End: 2021-09-26

## 2022-05-08 ENCOUNTER — HEALTH MAINTENANCE LETTER (OUTPATIENT)
Age: 42
End: 2022-05-08

## 2023-04-23 ENCOUNTER — HEALTH MAINTENANCE LETTER (OUTPATIENT)
Age: 43
End: 2023-04-23

## 2023-06-02 ENCOUNTER — HEALTH MAINTENANCE LETTER (OUTPATIENT)
Age: 43
End: 2023-06-02

## 2024-01-30 ENCOUNTER — HOSPITAL ENCOUNTER (EMERGENCY)
Facility: HOSPITAL | Age: 44
Discharge: HOME | End: 2024-01-30
Attending: EMERGENCY MEDICINE | Admitting: EMERGENCY MEDICINE
Payer: COMMERCIAL

## 2024-01-30 ENCOUNTER — APPOINTMENT (OUTPATIENT)
Dept: CT IMAGING | Facility: HOSPITAL | Age: 44
End: 2024-01-30
Attending: EMERGENCY MEDICINE
Payer: COMMERCIAL

## 2024-01-30 VITALS
OXYGEN SATURATION: 97 % | TEMPERATURE: 98.1 F | HEIGHT: 63 IN | RESPIRATION RATE: 16 BRPM | WEIGHT: 180 LBS | SYSTOLIC BLOOD PRESSURE: 106 MMHG | DIASTOLIC BLOOD PRESSURE: 70 MMHG | HEART RATE: 94 BPM | BODY MASS INDEX: 31.89 KG/M2

## 2024-01-30 DIAGNOSIS — N20.1 URETEROLITHIASIS: ICD-10-CM

## 2024-01-30 LAB
ALBUMIN SERPL BCG-MCNC: 3.3 G/DL (ref 3.5–5.2)
ALBUMIN UR-MCNC: 10 MG/DL
ALP SERPL-CCNC: 67 U/L (ref 40–150)
ALT SERPL W P-5'-P-CCNC: 22 U/L (ref 0–50)
ANION GAP SERPL CALCULATED.3IONS-SCNC: 6 MMOL/L (ref 7–15)
APPEARANCE UR: ABNORMAL
AST SERPL W P-5'-P-CCNC: 14 U/L (ref 0–45)
BILIRUB DIRECT SERPL-MCNC: <0.2 MG/DL (ref 0–0.3)
BILIRUB SERPL-MCNC: 0.2 MG/DL
BILIRUB UR QL STRIP: NEGATIVE
BUN SERPL-MCNC: 16.3 MG/DL (ref 6–20)
CALCIUM SERPL-MCNC: 7.2 MG/DL (ref 8.6–10)
CHLORIDE SERPL-SCNC: 114 MMOL/L (ref 98–107)
COLOR UR AUTO: YELLOW
CREAT SERPL-MCNC: 0.9 MG/DL (ref 0.51–0.95)
DEPRECATED HCO3 PLAS-SCNC: 23 MMOL/L (ref 22–29)
EGFRCR SERPLBLD CKD-EPI 2021: 82 ML/MIN/1.73M2
ERYTHROCYTE [DISTWIDTH] IN BLOOD BY AUTOMATED COUNT: 12.8 % (ref 10–15)
GLUCOSE SERPL-MCNC: 136 MG/DL (ref 70–99)
GLUCOSE UR STRIP-MCNC: 50 MG/DL
HCG SERPL QL: NEGATIVE
HCT VFR BLD AUTO: 36.8 % (ref 35–47)
HGB BLD-MCNC: 12.3 G/DL (ref 11.7–15.7)
HGB UR QL STRIP: ABNORMAL
HOLD SPECIMEN: NORMAL
HYALINE CASTS: 1 /LPF
KETONES UR STRIP-MCNC: NEGATIVE MG/DL
LEUKOCYTE ESTERASE UR QL STRIP: NEGATIVE
MCH RBC QN AUTO: 30.3 PG (ref 26.5–33)
MCHC RBC AUTO-ENTMCNC: 33.4 G/DL (ref 31.5–36.5)
MCV RBC AUTO: 91 FL (ref 78–100)
MUCOUS THREADS #/AREA URNS LPF: PRESENT /LPF
NITRATE UR QL: NEGATIVE
PH UR STRIP: 6 [PH] (ref 5–7)
PLATELET # BLD AUTO: 143 10E3/UL (ref 150–450)
POTASSIUM SERPL-SCNC: 3.1 MMOL/L (ref 3.4–5.3)
PROT SERPL-MCNC: 5.8 G/DL (ref 6.4–8.3)
RBC # BLD AUTO: 4.06 10E6/UL (ref 3.8–5.2)
RBC URINE: 54 /HPF
SODIUM SERPL-SCNC: 143 MMOL/L (ref 135–145)
SP GR UR STRIP: 1.03 (ref 1–1.03)
SQUAMOUS EPITHELIAL: 2 /HPF
UROBILINOGEN UR STRIP-MCNC: 2 MG/DL
WBC # BLD AUTO: 7.4 10E3/UL (ref 4–11)
WBC URINE: 2 /HPF

## 2024-01-30 PROCEDURE — 258N000003 HC RX IP 258 OP 636: Performed by: EMERGENCY MEDICINE

## 2024-01-30 PROCEDURE — 96376 TX/PRO/DX INJ SAME DRUG ADON: CPT

## 2024-01-30 PROCEDURE — 85027 COMPLETE CBC AUTOMATED: CPT | Performed by: EMERGENCY MEDICINE

## 2024-01-30 PROCEDURE — 84703 CHORIONIC GONADOTROPIN ASSAY: CPT | Performed by: EMERGENCY MEDICINE

## 2024-01-30 PROCEDURE — 80053 COMPREHEN METABOLIC PANEL: CPT | Performed by: EMERGENCY MEDICINE

## 2024-01-30 PROCEDURE — 250N000011 HC RX IP 250 OP 636: Performed by: EMERGENCY MEDICINE

## 2024-01-30 PROCEDURE — 96375 TX/PRO/DX INJ NEW DRUG ADDON: CPT

## 2024-01-30 PROCEDURE — 96374 THER/PROPH/DIAG INJ IV PUSH: CPT

## 2024-01-30 PROCEDURE — 74176 CT ABD & PELVIS W/O CONTRAST: CPT

## 2024-01-30 PROCEDURE — 99285 EMERGENCY DEPT VISIT HI MDM: CPT | Mod: 25

## 2024-01-30 PROCEDURE — 81001 URINALYSIS AUTO W/SCOPE: CPT | Performed by: EMERGENCY MEDICINE

## 2024-01-30 PROCEDURE — 36415 COLL VENOUS BLD VENIPUNCTURE: CPT | Performed by: STUDENT IN AN ORGANIZED HEALTH CARE EDUCATION/TRAINING PROGRAM

## 2024-01-30 PROCEDURE — 96361 HYDRATE IV INFUSION ADD-ON: CPT

## 2024-01-30 RX ORDER — ONDANSETRON 4 MG/1
4 TABLET, ORALLY DISINTEGRATING ORAL EVERY 6 HOURS PRN
Qty: 12 TABLET | Refills: 0 | Status: SHIPPED | OUTPATIENT
Start: 2024-01-30

## 2024-01-30 RX ORDER — KETOROLAC TROMETHAMINE 15 MG/ML
15 INJECTION, SOLUTION INTRAMUSCULAR; INTRAVENOUS ONCE
Status: COMPLETED | OUTPATIENT
Start: 2024-01-30 | End: 2024-01-30

## 2024-01-30 RX ORDER — HYDROMORPHONE HYDROCHLORIDE 1 MG/ML
0.5 INJECTION, SOLUTION INTRAMUSCULAR; INTRAVENOUS; SUBCUTANEOUS
Status: DISCONTINUED | OUTPATIENT
Start: 2024-01-30 | End: 2024-01-30 | Stop reason: HOSPADM

## 2024-01-30 RX ORDER — OXYCODONE HYDROCHLORIDE 5 MG/1
5 TABLET ORAL EVERY 4 HOURS PRN
Qty: 5 TABLET | Refills: 0 | Status: SHIPPED | OUTPATIENT
Start: 2024-01-30

## 2024-01-30 RX ORDER — DIMENHYDRINATE 50 MG
50 TABLET ORAL EVERY 6 HOURS PRN
Qty: 28 TABLET | Refills: 0 | Status: SHIPPED | OUTPATIENT
Start: 2024-01-30 | End: 2024-02-06

## 2024-01-30 RX ADMIN — KETOROLAC TROMETHAMINE 15 MG: 15 INJECTION, SOLUTION INTRAMUSCULAR; INTRAVENOUS at 10:53

## 2024-01-30 RX ADMIN — SODIUM CHLORIDE 1000 ML: 9 INJECTION, SOLUTION INTRAVENOUS at 10:54

## 2024-01-30 RX ADMIN — HYDROMORPHONE HYDROCHLORIDE 0.5 MG: 1 INJECTION, SOLUTION INTRAMUSCULAR; INTRAVENOUS; SUBCUTANEOUS at 13:21

## 2024-01-30 RX ADMIN — HYDROMORPHONE HYDROCHLORIDE 0.5 MG: 1 INJECTION, SOLUTION INTRAMUSCULAR; INTRAVENOUS; SUBCUTANEOUS at 10:53

## 2024-01-30 ASSESSMENT — ACTIVITIES OF DAILY LIVING (ADL)
ADLS_ACUITY_SCORE: 35
ADLS_ACUITY_SCORE: 35

## 2024-01-30 NOTE — DISCHARGE INSTRUCTIONS
[facility] [place] will call you to coordinate care as prescribed your provider. If you don t hear from a representative within 2 business days, please call [phone].     Motrin 600 mg every 6 hours  Tylenol 1000 mg every 8 hours      You have been prescribed a narcotic pain medication that has risk for addiction with prolonged use, so please use sparingly.  Additionally, narcotics are medications that are sedating (will make you sleepy), so do not drive or operate machinery while taking this medication.  Avoid alcohol or other sedating medicines such as benzodiazepines while taking narcotics due to risk for increased sedation and difficulty breathing.      Narcotics will cause constipation.  If you need to take this medication please consider taking an over-the-counter stool softener and laxative, such as Senna Plus, to prevent constipation from developing.  Nausea is a side effect of narcotic use.  Possible additional side effects include vomiting, itching, and dizziness/lightheadedness.

## 2024-01-30 NOTE — Clinical Note
Mary Ma was seen and treated in our emergency department on 1/30/2024.  She may return to work on 01/31/2024.       If you have any questions or concerns, please don't hesitate to call.      Ohl, Jami MOSQUEDA, DO

## 2024-01-30 NOTE — ED TRIAGE NOTES
"Pt brought in by EMS from home.  Pt woke from sleep with sharp RLQ pain radiating to right flank and N/V.  Pt states that the pressure \"feels like I have to pee and poop\".  Pt has history of kidney stones.     Triage Assessment (Adult)       Row Name 01/30/24 1035          Triage Assessment    Airway WDL WDL        Respiratory WDL    Respiratory WDL WDL        Skin Circulation/Temperature WDL    Skin Circulation/Temperature WDL WDL                     "

## 2024-01-30 NOTE — ED PROVIDER NOTES
EMERGENCY DEPARTMENT ENCOUNTER      NAME: Mary Ma  AGE: 43 year old female  YOB: 1980  MRN: [id number]  EVALUATION DATE & TIME: 1/30/2024 10:29 AM    PCP: Evelyn Smith    ED PROVIDER: Jami Taylor DO      Chief Complaint   Patient presents with    Abdominal Pain         FINAL IMPRESSION:  1. Ureterolithiasis          ED COURSE & MEDICAL DECISION MAKING:    Pertinent Labs & Imaging studies reviewed. (See chart for details)  10:39 AM I met the patient and performed my initial interview and exam.    43 year old female presents to the Emergency Department for evaluation of sudden onset right sided abdominal pain, nausea, vomiting.  Presentation consistent with acute renal stone. Urinalysis with hematuria. Other laboratory evaluation with no significant abnormality. Elected to obtain a CT scan to rule out significant hydronephrosis and to evaluate for size of the stone. CT showed 2mm stone likely in bladder. Provided the patient toradol, dilaudid in the ED..      History, physical exam, and radiographic findings were discussed with the patient.  At this time, it is felt that the most likely explanation for the patient's symptoms is acute nephrothiasis.  I also considered Cholecystitis, Appendicitis, Diverticulitis, Abdominal Aortic Aneurysm, Pancreatitis, Urinary Tract Infection, ovarian torsion but this appears less likely considering the data gathered thus far.  I have instructed the patient to return to the ER at any time if there are any new or worsening symptoms.  Provided prescription for pain medications and zofran. Instructed patient to followup with primary physician or urology.  The patient expressed understanding of and agreement with this plan.  Advised to return to ER if any concern for infection, uncontrolled pain, inability to urinate or other concerns.        At the conclusion of the encounter I discussed the results of all of the tests and the disposition. The questions  were answered. The patient or family acknowledged understanding and was agreeable with the care plan.     Medical Decision Making  Obtained supplemental history:Supplemental history obtained?: No  Reviewed external records: External records reviewed?: No  Care impacted by chronic illness:Mental Health  Care significantly affected by social determinants of health:N/A  Did you consider but not order tests?: Work up considered but not performed and documented in chart, if applicable  Did you interpret images independently?: Independent interpretation of ECG and images noted in documentation, when applicable.  Consultation discussion with other provider:Did you involve another provider (consultant, , pharmacy, etc.)?: No  Discharge. I prescribed additional prescription strength medication(s) as charted. I considered admission, but discharged patient after significant clinical improvement.      MEDICATIONS GIVEN IN THE EMERGENCY:  Medications   sodium chloride 0.9% BOLUS 1,000 mL (0 mLs Intravenous Stopped 1/30/24 1148)   ketorolac (TORADOL) injection 15 mg (15 mg Intravenous $Given 1/30/24 1053)       NEW PRESCRIPTIONS STARTED AT TODAY'S ER VISIT  Discharge Medication List as of 1/30/2024  1:28 PM        START taking these medications    Details   dimenhyDRINATE (DRAMAMINE) 50 MG tablet Take 1 tablet (50 mg) by mouth every 6 hours as needed for other (kidney stone pain management), Disp-28 tablet, R-0, Local Print      ondansetron (ZOFRAN ODT) 4 MG ODT tab Take 1 tablet (4 mg) by mouth every 6 hours as needed for nausea or vomiting, Disp-12 tablet, R-0, Local Print      oxyCODONE (ROXICODONE) 5 MG tablet Take 1 tablet (5 mg) by mouth every 4 hours as needed for severe pain If pain is not improved with acetaminophen and ibuprofen., Disp-5 tablet, R-0, Local Print                =================================================================    HPI    Patient information was obtained from: Patient    Use of  : N/A         Mary Ma is a 43 year old female with a pertinent history of anxiety, kidney stones, GERD who presents to this ED by EMS for evaluation of right-sided abdominal pain.  Symptoms started suddenly this morning at around 7 AM.  Notes sharp pain that radiates towards her pubis.  Has a sensation that she has to urinate and defecate but cannot.  She notes some nausea and vomiting.  History of prior kidney stone and this feels similar.  No fevers or chills.  No vaginal bleeding.  Notes she was placed on amoxicillin 3 days ago for sinusitis.  No prior abdominal surgeries.  She was given fentanyl and Zofran by EMS with minimal relief in her symptoms.      REVIEW OF SYSTEMS   Per HPI    PAST MEDICAL HISTORY:  Past Medical History:   Diagnosis Date    Anxiety     Anxiety     TALYA I (cervical intraepithelial neoplasia I) 2005    on colp (care everywhere)    GERD (gastroesophageal reflux disease)     GERD (gastroesophageal reflux disease)     Kidney stone     at age 36    Migraine     Mild major depression (H) 8/19/2014    Obesity     Other fatigue 12/16/2015    Other fatigue        PAST SURGICAL HISTORY:  Past Surgical History:   Procedure Laterality Date    ENT SURGERY  2002    sinus surgery    ESOPHAGOSCOPY, GASTROSCOPY, DUODENOSCOPY (EGD), COMBINED N/A 2/23/2016    Procedure: COMBINED ESOPHAGOSCOPY, GASTROSCOPY, DUODENOSCOPY (EGD);  Surgeon: Duane, William Charles, MD;  Location:  OR    NO HISTORY OF SURGERY             CURRENT MEDICATIONS:    dimenhyDRINATE (DRAMAMINE) 50 MG tablet  ondansetron (ZOFRAN ODT) 4 MG ODT tab  oxyCODONE (ROXICODONE) 5 MG tablet  Cholecalciferol (VITAMIN D) 2000 UNITS tablet  escitalopram (LEXAPRO) 20 MG tablet  escitalopram (LEXAPRO) 20 MG tablet  famotidine (PEPCID) 40 MG tablet  medroxyPROGESTERone (DEPO-PROVERA) 150 MG/ML injection  pantoprazole (PROTONIX) 20 MG EC tablet  pantoprazole (PROTONIX) 40 MG EC tablet         ALLERGIES:  Allergies   Allergen Reactions  "   Ceftin     Cefzil [Cefprozil]     Levaquin        FAMILY HISTORY:  Family History   Problem Relation Age of Onset    Diabetes Father     Depression/Anxiety Father     Diabetes Maternal Grandmother     Breast Cancer Maternal Grandmother     Cancer Maternal Grandmother     Breast Cancer Mother     Cerebrovascular Disease Mother     Cancer Mother         breast    Cancer Paternal Grandfather     Cancer Maternal Half-Sister         fallopian tube    Ovarian Cancer Other     Hypertension No family hx of     Thyroid Disease No family hx of     Glaucoma No family hx of     Macular Degeneration No family hx of     Heart Disease Paternal Uncle         heart attack       SOCIAL HISTORY:   Social History     Socioeconomic History    Marital status:    Tobacco Use    Smoking status: Former    Smokeless tobacco: Never    Tobacco comments:     no 2nd hand smoke exposure   Substance and Sexual Activity    Alcohol use: Yes     Comment: Alcoholic Drinks/day: very rare    Drug use: No    Sexual activity: Yes     Partners: Male     Birth control/protection: Injection   Other Topics Concern    Parent/sibling w/ CABG, MI or angioplasty before 65F 55M? No       VITALS:  /70   Pulse 94   Temp 98.1  F (36.7  C) (Oral)   Resp 16   Ht 1.6 m (5' 3\")   Wt 81.6 kg (180 lb)   SpO2 97%   BMI 31.89 kg/m      PHYSICAL EXAM    Physical Exam  Constitutional:       General: She is not in acute distress.  HENT:      Head: Normocephalic and atraumatic.      Mouth/Throat:      Pharynx: Oropharynx is clear.   Eyes:      Pupils: Pupils are equal, round, and reactive to light.   Cardiovascular:      Rate and Rhythm: Normal rate and regular rhythm.      Pulses: Normal pulses.      Heart sounds: Normal heart sounds.   Pulmonary:      Effort: Pulmonary effort is normal.      Breath sounds: Normal breath sounds.   Abdominal:      General: Abdomen is flat. Bowel sounds are normal.      Palpations: Abdomen is soft.      Tenderness: There is " abdominal tenderness in the right lower quadrant.   Musculoskeletal:         General: Normal range of motion.   Skin:     General: Skin is warm and dry.      Capillary Refill: Capillary refill takes less than 2 seconds.   Neurological:      General: No focal deficit present.      Mental Status: She is alert and oriented to person, place, and time.           LAB:  All pertinent labs reviewed and interpreted.  Labs Ordered and Resulted from Time of ED Arrival to Time of ED Departure   ROUTINE UA WITH MICROSCOPIC REFLEX TO CULTURE - Abnormal       Result Value    Color Urine Yellow      Appearance Urine Slightly Cloudy (*)     Glucose Urine 50 (*)     Bilirubin Urine Negative      Ketones Urine Negative      Specific Gravity Urine 1.029      Blood Urine 0.2 mg/dL (*)     pH Urine 6.0      Protein Albumin Urine 10 (*)     Urobilinogen Urine 2.0 (*)     Nitrite Urine Negative      Leukocyte Esterase Urine Negative      Mucus Urine Present (*)     RBC Urine 54 (*)     WBC Urine 2      Squamous Epithelials Urine 2 (*)     Hyaline Casts Urine 1     BASIC METABOLIC PANEL - Abnormal    Sodium 143      Potassium 3.1 (*)     Chloride 114 (*)     Carbon Dioxide (CO2) 23      Anion Gap 6 (*)     Urea Nitrogen 16.3      Creatinine 0.90      GFR Estimate 82      Calcium 7.2 (*)     Glucose 136 (*)    CBC WITH PLATELETS - Abnormal    WBC Count 7.4      RBC Count 4.06      Hemoglobin 12.3      Hematocrit 36.8      MCV 91      MCH 30.3      MCHC 33.4      RDW 12.8      Platelet Count 143 (*)    HEPATIC FUNCTION PANEL - Abnormal    Protein Total 5.8 (*)     Albumin 3.3 (*)     Bilirubin Total 0.2      Alkaline Phosphatase 67      AST 14      ALT 22      Bilirubin Direct <0.20     HCG QUALITATIVE PREGNANCY - Normal    hCG Serum Qualitative Negative         RADIOLOGY:  Reviewed all pertinent imaging. Please see official radiology report.  CT Abdomen Pelvis w/o Contrast   Final Result   IMPRESSION:    1.  Mild to moderate right  hydroureteronephrosis. 2 mm calculus in the urinary bladder adjacent to the right UVJ, likely recently passed from the right ureter.   2.  Mild hepatic steatosis.             Jami Taylor DO  Emergency Medicine  [facility] EMERGENCY DEPARTMENT  [address] [place] [id number]  [phone]  Dept: [phone]       Jami Taylor DO  01/30/24 2015

## 2024-01-30 NOTE — ED NOTES
Bed: JNED-K  Expected date:   Expected time:   Means of arrival: Ambulance  Comments:  WBL: Abd pain

## 2024-02-02 ENCOUNTER — PRE VISIT (OUTPATIENT)
Dept: UROLOGY | Facility: CLINIC | Age: 44
End: 2024-02-02
Payer: COMMERCIAL

## 2024-02-06 NOTE — TELEPHONE ENCOUNTER
MEDICAL RECORDS REQUEST   [facility]  [address] [place] [address]  PHONE: [phone]  Fax: [phone]        FUTURE VISIT INFORMATION                                                   Mary Ma, : 1980 scheduled for future visit at [facility]    APPOINTMENT INFORMATION:  Date: 2024  Provider:  Jing Poole CNP  Reason for Visit/Diagnosis: Ureterolithiasis    REFERRAL INFORMATION:  Referring provider:  Jami Taylor DO      RECORDS REQUESTED FOR VISIT                                                     NOTES  STATUS/DETAILS   DISCHARGE REPORT from the ER  YES, 2023 -- [facility] ED   MEDICATION LIST  yes   LABS     URINALYSIS (UA)  yes   IMAGES  yes, 2024 -- CT ABD PELVIS     PRE-VISIT CHECKLIST      Joint diagnostic appointment coordinated correctly          (ensure right order & amount of time) Yes   RECORD COLLECTION COMPLETE Yes

## 2024-02-16 ENCOUNTER — PRE VISIT (OUTPATIENT)
Dept: UROLOGY | Facility: CLINIC | Age: 44
End: 2024-02-16

## 2024-06-30 ENCOUNTER — HEALTH MAINTENANCE LETTER (OUTPATIENT)
Age: 44
End: 2024-06-30

## 2024-09-10 ENCOUNTER — APPOINTMENT (OUTPATIENT)
Dept: CT IMAGING | Facility: HOSPITAL | Age: 44
End: 2024-09-10
Attending: PHYSICIAN ASSISTANT
Payer: COMMERCIAL

## 2024-09-10 ENCOUNTER — HOSPITAL ENCOUNTER (EMERGENCY)
Facility: HOSPITAL | Age: 44
Discharge: HOME | End: 2024-09-10
Admitting: PHYSICIAN ASSISTANT
Payer: COMMERCIAL

## 2024-09-10 VITALS
WEIGHT: 185 LBS | RESPIRATION RATE: 16 BRPM | TEMPERATURE: 98.3 F | DIASTOLIC BLOOD PRESSURE: 60 MMHG | BODY MASS INDEX: 32.77 KG/M2 | SYSTOLIC BLOOD PRESSURE: 112 MMHG | OXYGEN SATURATION: 96 % | HEART RATE: 85 BPM

## 2024-09-10 DIAGNOSIS — K59.00 CONSTIPATION: ICD-10-CM

## 2024-09-10 LAB
ALBUMIN SERPL BCG-MCNC: 4.1 G/DL (ref 3.5–5.2)
ALBUMIN UR-MCNC: 10 MG/DL
ALP SERPL-CCNC: 95 U/L (ref 40–150)
ALT SERPL W P-5'-P-CCNC: 27 U/L (ref 0–50)
ANION GAP SERPL CALCULATED.3IONS-SCNC: 10 MMOL/L (ref 7–15)
APPEARANCE UR: CLEAR
AST SERPL W P-5'-P-CCNC: 18 U/L (ref 0–45)
BASOPHILS # BLD AUTO: 0.1 10E3/UL (ref 0–0.2)
BASOPHILS NFR BLD AUTO: 1 %
BILIRUB DIRECT SERPL-MCNC: <0.2 MG/DL (ref 0–0.3)
BILIRUB SERPL-MCNC: 0.4 MG/DL
BILIRUB UR QL STRIP: NEGATIVE
BUN SERPL-MCNC: 15.4 MG/DL (ref 6–20)
CALCIUM SERPL-MCNC: 9.1 MG/DL (ref 8.8–10.4)
CHLORIDE SERPL-SCNC: 106 MMOL/L (ref 98–107)
COLOR UR AUTO: ABNORMAL
CREAT SERPL-MCNC: 0.96 MG/DL (ref 0.51–0.95)
EGFRCR SERPLBLD CKD-EPI 2021: 76 ML/MIN/1.73M2
EOSINOPHIL # BLD AUTO: 0.2 10E3/UL (ref 0–0.7)
EOSINOPHIL NFR BLD AUTO: 4 %
ERYTHROCYTE [DISTWIDTH] IN BLOOD BY AUTOMATED COUNT: 12.9 % (ref 10–15)
GLUCOSE SERPL-MCNC: 90 MG/DL (ref 70–99)
GLUCOSE UR STRIP-MCNC: NEGATIVE MG/DL
HCG UR QL: NEGATIVE
HCO3 SERPL-SCNC: 23 MMOL/L (ref 22–29)
HCT VFR BLD AUTO: 40.7 % (ref 35–47)
HGB BLD-MCNC: 13.6 G/DL (ref 11.7–15.7)
HGB UR QL STRIP: NEGATIVE
IMM GRANULOCYTES # BLD: 0 10E3/UL
IMM GRANULOCYTES NFR BLD: 1 %
KETONES UR STRIP-MCNC: NEGATIVE MG/DL
LEUKOCYTE ESTERASE UR QL STRIP: ABNORMAL
LIPASE SERPL-CCNC: 27 U/L (ref 13–60)
LYMPHOCYTES # BLD AUTO: 1.3 10E3/UL (ref 0.8–5.3)
LYMPHOCYTES NFR BLD AUTO: 27 %
MCH RBC QN AUTO: 30.8 PG (ref 26.5–33)
MCHC RBC AUTO-ENTMCNC: 33.4 G/DL (ref 31.5–36.5)
MCV RBC AUTO: 92 FL (ref 78–100)
MONOCYTES # BLD AUTO: 0.4 10E3/UL (ref 0–1.3)
MONOCYTES NFR BLD AUTO: 7 %
MUCOUS THREADS #/AREA URNS LPF: PRESENT /LPF
NEUTROPHILS # BLD AUTO: 3 10E3/UL (ref 1.6–8.3)
NEUTROPHILS NFR BLD AUTO: 61 %
NITRATE UR QL: NEGATIVE
NRBC # BLD AUTO: 0 10E3/UL
NRBC BLD AUTO-RTO: 0 /100
PH UR STRIP: 6.5 [PH] (ref 5–7)
PLATELET # BLD AUTO: 165 10E3/UL (ref 150–450)
POTASSIUM SERPL-SCNC: 4.2 MMOL/L (ref 3.4–5.3)
PROT SERPL-MCNC: 7.4 G/DL (ref 6.4–8.3)
RBC # BLD AUTO: 4.42 10E6/UL (ref 3.8–5.2)
RBC URINE: 3 /HPF
SODIUM SERPL-SCNC: 139 MMOL/L (ref 135–145)
SP GR UR STRIP: 1.03 (ref 1–1.03)
SQUAMOUS EPITHELIAL: 2 /HPF
UROBILINOGEN UR STRIP-MCNC: <2 MG/DL
WBC # BLD AUTO: 4.9 10E3/UL (ref 4–11)
WBC URINE: <1 /HPF

## 2024-09-10 PROCEDURE — 85041 AUTOMATED RBC COUNT: CPT | Performed by: PHYSICIAN ASSISTANT

## 2024-09-10 PROCEDURE — 36415 COLL VENOUS BLD VENIPUNCTURE: CPT | Performed by: PHYSICIAN ASSISTANT

## 2024-09-10 PROCEDURE — 258N000003 HC RX IP 258 OP 636: Performed by: PHYSICIAN ASSISTANT

## 2024-09-10 PROCEDURE — 250N000011 HC RX IP 250 OP 636: Performed by: PHYSICIAN ASSISTANT

## 2024-09-10 PROCEDURE — 80053 COMPREHEN METABOLIC PANEL: CPT | Performed by: PHYSICIAN ASSISTANT

## 2024-09-10 PROCEDURE — 83690 ASSAY OF LIPASE: CPT | Performed by: PHYSICIAN ASSISTANT

## 2024-09-10 PROCEDURE — 96376 TX/PRO/DX INJ SAME DRUG ADON: CPT

## 2024-09-10 PROCEDURE — 81025 URINE PREGNANCY TEST: CPT | Performed by: PHYSICIAN ASSISTANT

## 2024-09-10 PROCEDURE — 96374 THER/PROPH/DIAG INJ IV PUSH: CPT | Mod: 59

## 2024-09-10 PROCEDURE — 96375 TX/PRO/DX INJ NEW DRUG ADDON: CPT

## 2024-09-10 PROCEDURE — 81001 URINALYSIS AUTO W/SCOPE: CPT | Performed by: PHYSICIAN ASSISTANT

## 2024-09-10 PROCEDURE — 74177 CT ABD & PELVIS W/CONTRAST: CPT

## 2024-09-10 PROCEDURE — 99285 EMERGENCY DEPT VISIT HI MDM: CPT | Mod: 25

## 2024-09-10 PROCEDURE — 96361 HYDRATE IV INFUSION ADD-ON: CPT

## 2024-09-10 RX ORDER — IOPAMIDOL 755 MG/ML
90 INJECTION, SOLUTION INTRAVASCULAR ONCE
Status: COMPLETED | OUTPATIENT
Start: 2024-09-10 | End: 2024-09-10

## 2024-09-10 RX ORDER — ONDANSETRON 2 MG/ML
4 INJECTION INTRAMUSCULAR; INTRAVENOUS ONCE
Status: COMPLETED | OUTPATIENT
Start: 2024-09-10 | End: 2024-09-10

## 2024-09-10 RX ORDER — HYDROMORPHONE HYDROCHLORIDE 1 MG/ML
0.5 INJECTION, SOLUTION INTRAMUSCULAR; INTRAVENOUS; SUBCUTANEOUS ONCE
Status: COMPLETED | OUTPATIENT
Start: 2024-09-10 | End: 2024-09-10

## 2024-09-10 RX ADMIN — HYDROMORPHONE HYDROCHLORIDE 0.5 MG: 1 INJECTION, SOLUTION INTRAMUSCULAR; INTRAVENOUS; SUBCUTANEOUS at 20:20

## 2024-09-10 RX ADMIN — IOPAMIDOL 90 ML: 755 INJECTION, SOLUTION INTRAVENOUS at 20:05

## 2024-09-10 RX ADMIN — SODIUM CHLORIDE 1000 ML: 9 INJECTION, SOLUTION INTRAVENOUS at 18:25

## 2024-09-10 RX ADMIN — HYDROMORPHONE HYDROCHLORIDE 0.5 MG: 1 INJECTION, SOLUTION INTRAMUSCULAR; INTRAVENOUS; SUBCUTANEOUS at 18:42

## 2024-09-10 RX ADMIN — ONDANSETRON 4 MG: 2 INJECTION INTRAMUSCULAR; INTRAVENOUS at 18:27

## 2024-09-10 ASSESSMENT — COLUMBIA-SUICIDE SEVERITY RATING SCALE - C-SSRS
6. HAVE YOU EVER DONE ANYTHING, STARTED TO DO ANYTHING, OR PREPARED TO DO ANYTHING TO END YOUR LIFE?: NO
2. HAVE YOU ACTUALLY HAD ANY THOUGHTS OF KILLING YOURSELF IN THE PAST MONTH?: NO
1. IN THE PAST MONTH, HAVE YOU WISHED YOU WERE DEAD OR WISHED YOU COULD GO TO SLEEP AND NOT WAKE UP?: NO

## 2024-09-10 ASSESSMENT — ACTIVITIES OF DAILY LIVING (ADL)
ADLS_ACUITY_SCORE: 35

## 2024-09-10 NOTE — ED PROVIDER NOTES
EMERGENCY DEPARTMENT ENCOUNTER   NAME: Mary Ma ; AGE: 43 year old female ; YOB: 1980 ; MRN: [id number] ; PCP: Evelyn Smith     Evaluation Date & Time: No admission date for patient encounter.    ED Provider: Tory Malone PA-C    CHIEF COMPLAINT     Abdominal Pain and Rectal/perineal Pain      FINAL ASSESSMENT       ICD-10-CM    1. Constipation  K59.00           ED COURSE, MEDICAL DECISION MAKING, PLAN     ED course     5:59 PM Evaluated patient. Performed physical exam. Plan for line, labs, meds, imaging.   8:37 PM Rechecked and updated patient. Performed JACQUES. No stool in rectal vault noted. Signed patient out to Patti Villatoro PA-C who will follow up with CT results and dispo planning.   ______________________________________________________________________    Reason for visit: Mary Ma is a 43 year old female with GERD, ARIELLA, prediabetes presenting for left lower abdominal pain, rectal pain, and no bowel movement in about 5 days. No other associated symptoms.     Exam: Mild palpable pain in the left lower abdomen. JACQUES is unremarkable without stool in the rectal vault, hemorrhoids, abscess, etc. The rest of the exam is benign. Vitally normal.     Acutely serious/life threatening considerations: Mesenteric ischemia, SBO, perforated peptic ulcer, cholangitis, appendicitis, cholecystitis, pancreatitis, diverticulitis with abscess/perforation, incarcerated/strangled hernia, ectopic pregnancy, ovarian torsion      Labs: Which her workup was unrevealing including CBC, BMP, hepatic panel, lipase, UA.    EKG: N/A    Imaging: CT of the abdomen and pelvis with contrast obtained.  Radiologist read: 1.  No acute findings. No inflammatory process or bowel obstruction. 2.  Mild to moderate colonic stool burden.    Consults: None    Interventions/recheck: Patient was given 1 L of normal saline, 0.5 mg of Dilaudid, and 4 mg of Zofran.  On recheck she was feeling improved, but her pain did start  "to creep back up again and she was given an additional 0.5 mg of IV Dilaudid.    Assessment: I suspect patient is dealing with acute on chronic constipation.  Final diagnosis is pending CT imaging at this point.  Patti Villatoro PA-C will follow-up on this result and dispo accordingly.    Plan: Patient signed out to Patti Villatoro PA-C with CT imaging pending.  She will.  And dispo accordingly.      *All pertinent lab & imaging studies independently reviewed. (See chart for details)   *Discussed the results of all the tests and plan with patient and family/guardians.       HISTORY OF PRESENT ILLNESS   Patient information was obtained from: Patient   Use of Intrepreter: None     Pertinent past medical history: GERD, ARIELLA, prediabetes    Mary Ma is here by means of walk in  with self for evaluation of left sided abdominal pain and rectal pain over the last 4-5 days.     She reports being seen for the same thing on 9/4 and being diagnosed with constipation. She states she did a \"clean out\" and had a lot of stools the following day. Reports having none since and the pain is starting again.     She tried taking a muscle relaxer at home which did not help any.     No fevers, nausea, vomiting.   No urinary symptoms.     No other concerns.     Per my chart review  9/4/2024: [facility] ER for abdominal pain in the lower abdomen. Labs unremarkable. CT showed large stool burden otherwise unremarkable. Discharged to home with bowel regimen.     MEDICAL HISTORY     Past Medical History:   Diagnosis Date    Anxiety     Anxiety     TALYA I (cervical intraepithelial neoplasia I) 2005    GERD (gastroesophageal reflux disease)     GERD (gastroesophageal reflux disease)     Kidney stone     Migraine     Mild major depression (H) 8/19/2014    Obesity     Other fatigue 12/16/2015    Other fatigue        Past Surgical History:   Procedure Laterality Date    ENT SURGERY  2002    sinus surgery    ESOPHAGOSCOPY, GASTROSCOPY, DUODENOSCOPY (EGD), " COMBINED N/A 2/23/2016    Procedure: COMBINED ESOPHAGOSCOPY, GASTROSCOPY, DUODENOSCOPY (EGD);  Surgeon: Duane, William Charles, MD;  Location: [facility] OR    NO HISTORY OF SURGERY         Family History   Problem Relation Age of Onset    Diabetes Father     Depression/Anxiety Father     Diabetes Maternal Grandmother     Breast Cancer Maternal Grandmother     Cancer Maternal Grandmother     Breast Cancer Mother     Cerebrovascular Disease Mother     Cancer Mother         breast    Cancer Paternal Grandfather     Cancer Maternal Half-Sister         fallopian tube    Ovarian Cancer Other     Hypertension No family hx of     Thyroid Disease No family hx of     Glaucoma No family hx of     Macular Degeneration No family hx of     Heart Disease Paternal Uncle         heart attack       Social History     Tobacco Use    Smoking status: Former    Smokeless tobacco: Never    Tobacco comments:     no 2nd hand smoke exposure   Substance Use Topics    Alcohol use: Yes     Comment: Alcoholic Drinks/day: very rare    Drug use: No       Cholecalciferol (VITAMIN D) 2000 UNITS tablet  escitalopram (LEXAPRO) 20 MG tablet  escitalopram (LEXAPRO) 20 MG tablet  famotidine (PEPCID) 40 MG tablet  medroxyPROGESTERone (DEPO-PROVERA) 150 MG/ML injection  ondansetron (ZOFRAN ODT) 4 MG ODT tab  oxyCODONE (ROXICODONE) 5 MG tablet  pantoprazole (PROTONIX) 20 MG EC tablet  pantoprazole (PROTONIX) 40 MG EC tablet          PHYSICAL EXAM     First Vitals:  No data found.      PHYSICAL EXAM:   Constitutional: No acute distress.  Neuro: Awake and alert. No focal deficits.  Psych: Calm and cooperative.  Eyes: PERRL. EOMI. Conjunctivae clear.   Mouth: Pink and moist.   Cardio: Regular rate. Adequate perfusion to extremities. Regular rhythm. No murmurs.  Pulmonary: Oxygenating well on RA. No labored breathing. CTA b/l.  Abdomen: Tenderness in the left lower abdomen with palpation.  BS present. Soft. Non-distended and no rigidity. No rebound or guarding.    Back: Appears to move freely.No CVA tenderness.    Skin: Natural color, warm, dry, intact.       RESULTS     LAB:  All pertinent labs reviewed and interpreted  Labs Ordered and Resulted from Time of ED Arrival to Time of ED Departure   BASIC METABOLIC PANEL - Abnormal       Result Value    Sodium 139      Potassium 4.2      Chloride 106      Carbon Dioxide (CO2) 23      Anion Gap 10      Urea Nitrogen 15.4      Creatinine 0.96 (*)     GFR Estimate 76      Calcium 9.1      Glucose 90     ROUTINE UA WITH MICROSCOPIC REFLEX TO CULTURE - Abnormal    Color Urine Light Yellow      Appearance Urine Clear      Glucose Urine Negative      Bilirubin Urine Negative      Ketones Urine Negative      Specific Gravity Urine 1.027      Blood Urine Negative      pH Urine 6.5      Protein Albumin Urine 10 (*)     Urobilinogen Urine <2.0      Nitrite Urine Negative      Leukocyte Esterase Urine 75 Jamila/uL (*)     Mucus Urine Present (*)     RBC Urine 3 (*)     WBC Urine <1      Squamous Epithelials Urine 2 (*)    HEPATIC FUNCTION PANEL - Normal    Protein Total 7.4      Albumin 4.1      Bilirubin Total 0.4      Alkaline Phosphatase 95      AST 18      ALT 27      Bilirubin Direct <0.20     LIPASE - Normal    Lipase 27     HCG QUALITATIVE URINE - Normal    hCG Urine Qualitative Negative     CBC WITH PLATELETS AND DIFFERENTIAL    WBC Count 4.9      RBC Count 4.42      Hemoglobin 13.6      Hematocrit 40.7      MCV 92      MCH 30.8      MCHC 33.4      RDW 12.9      Platelet Count 165      % Neutrophils 61      % Lymphocytes 27      % Monocytes 7      % Eosinophils 4      % Basophils 1      % Immature Granulocytes 1      NRBCs per 100 WBC 0      Absolute Neutrophils 3.0      Absolute Lymphocytes 1.3      Absolute Monocytes 0.4      Absolute Eosinophils 0.2      Absolute Basophils 0.1      Absolute Immature Granulocytes 0.0      Absolute NRBCs 0.0         RADIOLOGY:  CT Abdomen Pelvis w Contrast   Final Result   IMPRESSION:    1.  No acute  findings. No inflammatory process or bowel obstruction.   2.  Mild to moderate colonic stool burden.          ECG:  N/A      PROCEDURES     None           FINAL IMPRESSION:    ICD-10-CM    1. Constipation  K59.00           MEDICATIONS GIVEN IN THE EMERGENCY DEPARTMENT:  Medications   sodium chloride 0.9% BOLUS 1,000 mL (0 mLs Intravenous Stopped 9/10/24 2020)   ondansetron (ZOFRAN) injection 4 mg (4 mg Intravenous $Given 9/10/24 1827)   HYDROmorphone (DILAUDID) injection 0.5 mg (0.5 mg Intravenous $Given 9/10/24 1842)   iopamidol (ISOVUE-370) solution 90 mL (90 mLs Intravenous $Given 9/10/24 2005)   HYDROmorphone (PF) (DILAUDID) injection 0.5 mg (0.5 mg Intravenous $Given 9/10/24 2020)       NEW PRESCRIPTIONS STARTED AT [facility]'S ED VISIT:  Discharge Medication List as of 9/10/2024  9:39 PM               MEDICAL DECISION MAKING:  Obtained supplemental history:Supplemental history obtained?: No  Reviewed external records: External records reviewed?: Outpatient Record: See HPI  Care impacted by chronic illness:N/A  Care significantly affected by social determinants of health:N/A  Did you consider but not order tests?: Work up considered but not performed and documented in chart, if applicable  Did you interpret images independently?: Independent interpretation of ECG and images noted in documentation, when applicable.  Consultation discussion with other provider:Did you involve another provider (consultant, , pharmacy, etc.)?: No  Admission considered. Patient was signed out to the oncoming physician, disposition pending.      MIPS:  Not Applicable      CRITICAL CARE:  N/A           Some or all of this documentation has been completed using dictation software and mild grammatical errors may be present. Please contact me with any concerns regarding this.       Tory Malone PA-C  Emergency Medicine   [facility] EMERGENCY DEPARTMENT       Tory Malone PA-C  09/13/24 7112

## 2024-09-10 NOTE — ED TRIAGE NOTES
Detail Level: Zone Pt to ED with complaint of ABD pain and rectal pain. Seen on the 9/4/24 in ED with same complaints. Found to be constipated. Started on stool softeners. Had runny stool on 9/5. No stools since. Pain returned today when she woke up. Took muscle relaxer today with no relief. Taking stool softeners at home with no stools. Passing gas daily     Triage Assessment (Adult)       Row Name 09/10/24 0208          Triage Assessment    Airway WDL WDL        Respiratory WDL    Respiratory WDL WDL        Skin Circulation/Temperature WDL    Skin Circulation/Temperature WDL WDL        Cardiac WDL    Cardiac WDL WDL        Peripheral/Neurovascular WDL    Peripheral Neurovascular WDL WDL        Cognitive/Neuro/Behavioral WDL    Cognitive/Neuro/Behavioral WDL WDL                      Azithromycin Pregnancy And Lactation Text: This medication is considered safe during pregnancy and is also secreted in breast milk. High Dose Vitamin A Pregnancy And Lactation Text: High dose vitamin A therapy is contraindicated during pregnancy and breast feeding. Minocycline Pregnancy And Lactation Text: This medication is Pregnancy Category D and not consider safe during pregnancy. It is also excreted in breast milk. Topical Sulfur Applications Pregnancy And Lactation Text: This medication is Pregnancy Category C and has an unknown safety profile during pregnancy. It is unknown if this topical medication is excreted in breast milk. Bactrim Pregnancy And Lactation Text: This medication is Pregnancy Category D and is known to cause fetal risk.  It is also excreted in breast milk. Doxycycline Counseling:  Patient counseled regarding possible photosensitivity and increased risk for sunburn.  Patient instructed to avoid sunlight, if possible.  When exposed to sunlight, patients should wear protective clothing, sunglasses, and sunscreen.  The patient was instructed to call the office immediately if the following severe adverse effects occur:  hearing changes, easy bruising/bleeding, severe headache, or vision changes.  The patient verbalized understanding of the proper use and possible adverse effects of doxycycline.  All of the patient's questions and concerns were addressed. Benzoyl Peroxide Counseling: Patient counseled that medicine may cause skin irritation and bleach clothing.  In the event of skin irritation, the patient was advised to reduce the amount of the drug applied or use it less frequently.   The patient verbalized understanding of the proper use and possible adverse effects of benzoyl peroxide.  All of the patient's questions and concerns were addressed. Tazorac Pregnancy And Lactation Text: This medication is not safe during pregnancy. It is unknown if this medication is excreted in breast milk. Patient Specific Counseling (Will Not Stick From Patient To Patient): Advised patient that if she finds out that she is pregnant, she must stop the Clindagel immediately. Patient expressed verbal understanding and agrees. Isotretinoin Pregnancy And Lactation Text: This medication is Pregnancy Category X and is considered extremely dangerous during pregnancy. It is unknown if it is excreted in breast milk. Topical Clindamycin Pregnancy And Lactation Text: This medication is Pregnancy Category B and is considered safe during pregnancy. It is unknown if it is excreted in breast milk. Dapsone Counseling: I discussed with the patient the risks of dapsone including but not limited to hemolytic anemia, agranulocytosis, rashes, methemoglobinemia, kidney failure, peripheral neuropathy, headaches, GI upset, and liver toxicity.  Patients who start dapsone require monitoring including baseline LFTs and weekly CBCs for the first month, then every month thereafter.  The patient verbalized understanding of the proper use and possible adverse effects of dapsone.  All of the patient's questions and concerns were addressed. Tetracycline Counseling: Patient counseled regarding possible photosensitivity and increased risk for sunburn.  Patient instructed to avoid sunlight, if possible.  When exposed to sunlight, patients should wear protective clothing, sunglasses, and sunscreen.  The patient was instructed to call the office immediately if the following severe adverse effects occur:  hearing changes, easy bruising/bleeding, severe headache, or vision changes.  The patient verbalized understanding of the proper use and possible adverse effects of tetracycline.  All of the patient's questions and concerns were addressed. Patient understands to avoid pregnancy while on therapy due to potential birth defects. Topical Retinoid Pregnancy And Lactation Text: This medication is Pregnancy Category C. It is unknown if this medication is excreted in breast milk. Erythromycin Pregnancy And Lactation Text: This medication is Pregnancy Category B and is considered safe during pregnancy. It is also excreted in breast milk. Include Pregnancy/Lactation Warning?: Yes Bactrim Counseling:  I discussed with the patient the risks of sulfa antibiotics including but not limited to GI upset, allergic reaction, drug rash, diarrhea, dizziness, photosensitivity, and yeast infections.  Rarely, more serious reactions can occur including but not limited to aplastic anemia, agranulocytosis, methemoglobinemia, blood dyscrasias, liver or kidney failure, lung infiltrates or desquamative/blistering drug rashes. Birth Control Pills Counseling: Birth Control Pill Counseling: I discussed with the patient the potential side effects of OCPs including but not limited to increased risk of stroke, heart attack, thrombophlebitis, deep venous thrombosis, hepatic adenomas, breast changes, GI upset, headaches, and depression.  The patient verbalized understanding of the proper use and possible adverse effects of OCPs. All of the patient's questions and concerns were addressed. Spironolactone Counseling: Patient advised regarding risks of diarrhea, abdominal pain, hyperkalemia, birth defects (for female patients), liver toxicity and renal toxicity. The patient may need blood work to monitor liver and kidney function and potassium levels while on therapy. The patient verbalized understanding of the proper use and possible adverse effects of spironolactone.  All of the patient's questions and concerns were addressed. Benzoyl Peroxide Pregnancy And Lactation Text: This medication is Pregnancy Category C. It is unknown if benzoyl peroxide is excreted in breast milk. Doxycycline Pregnancy And Lactation Text: This medication is Pregnancy Category D and not consider safe during pregnancy. It is also excreted in breast milk but is considered safe for shorter treatment courses. High Dose Vitamin A Counseling: Side effects reviewed, pt to contact office should one occur. Topical Clindamycin Counseling: Patient counseled that this medication may cause skin irritation or allergic reactions.  In the event of skin irritation, the patient was advised to reduce the amount of the drug applied or use it less frequently.   The patient verbalized understanding of the proper use and possible adverse effects of clindamycin.  All of the patient's questions and concerns were addressed. Azithromycin Counseling:  I discussed with the patient the risks of azithromycin including but not limited to GI upset, allergic reaction, drug rash, diarrhea, and yeast infections. Topical Sulfur Applications Counseling: Topical Sulfur Counseling: Patient counseled that this medication may cause skin irritation or allergic reactions.  In the event of skin irritation, the patient was advised to reduce the amount of the drug applied or use it less frequently.   The patient verbalized understanding of the proper use and possible adverse effects of topical sulfur application.  All of the patient's questions and concerns were addressed. Minocycline Counseling: Patient advised regarding possible photosensitivity and discoloration of the teeth, skin, lips, tongue and gums.  Patient instructed to avoid sunlight, if possible.  When exposed to sunlight, patients should wear protective clothing, sunglasses, and sunscreen.  The patient was instructed to call the office immediately if the following severe adverse effects occur:  hearing changes, easy bruising/bleeding, severe headache, or vision changes.  The patient verbalized understanding of the proper use and possible adverse effects of minocycline.  All of the patient's questions and concerns were addressed. Dapsone Pregnancy And Lactation Text: This medication is Pregnancy Category C and is not considered safe during pregnancy or breast feeding. Isotretinoin Counseling: Patient should get monthly blood tests, not donate blood, not drive at night if vision affected, not share medication, and not undergo elective surgery for 6 months after tx completed. Side effects reviewed, pt to contact office should one occur. Tazorac Counseling:  Patient advised that medication is irritating and drying.  Patient may need to apply sparingly and wash off after an hour before eventually leaving it on overnight.  The patient verbalized understanding of the proper use and possible adverse effects of tazorac.  All of the patient's questions and concerns were addressed. Spironolactone Pregnancy And Lactation Text: This medication can cause feminization of the male fetus and should be avoided during pregnancy. The active metabolite is also found in breast milk. Birth Control Pills Pregnancy And Lactation Text: This medication should be avoided if pregnant and for the first 30 days post-partum. Erythromycin Counseling:  I discussed with the patient the risks of erythromycin including but not limited to GI upset, allergic reaction, drug rash, diarrhea, increase in liver enzymes, and yeast infections. Topical Retinoid counseling:  Patient advised to apply a pea-sized amount only at bedtime and wait 30 minutes after washing their face before applying.  If too drying, patient may add a non-comedogenic moisturizer. The patient verbalized understanding of the proper use and possible adverse effects of retinoids.  All of the patient's questions and concerns were addressed.

## 2024-09-11 NOTE — DISCHARGE INSTRUCTIONS
Continue with Miralax daily.   I would add in Senna until you feel like you are having good bowel movements.   Drink plenty of water/fluids during the day.   Eat a diet high in fiber.  Schedule a follow up with your primary care provider.

## 2024-09-11 NOTE — ED PROVIDER NOTES
EMERGENCY DEPARTMENT SIGN OUT NOTE        ED COURSE AND MEDICAL DECISION MAKING  Patient was signed out to me by Tory Malone PA-C at 8:38 PM for follow up on CT and if negative, plan for likely discharge.   9:28 PM Discussed discharge with the patient, she is agreeable with this plan.     In brief, Mary Ma is a 43 year old female who initially presented for evaluation of rectal pain and constipation. Patient was seen on 9/4/24 for similar complaints. At  that time, the patient was found to be constipated. She was started on stool softeners. Patient had a runny stool the following day, no stools since then. Patient experienced return of the pain when she woke up today. Muscle relaxers without relief at home. Patient has been passing gas. See Initial ED Note for more detailed history.     At time of sign out, disposition was pending CT imaging.     CT returned and showed mild to moderate stool burden.  No significant constipation, no evidence of obstruction, no fecal impaction.  Perirectal tissue unremarkable without evidence of perirectal/perianal abscess.  Rectal exam was performed by my colleague, and there is no evidence of irritated hemorrhoids, anal fissure, abscess or fecal impaction.  At this time, her symptoms are consistent with ongoing constipation.  She has already been trying increased fluids and MiraLAX, advised over-the-counter stool softener and will add senna to her stool regimen.  We discussed plan for recheck in her clinic in 1 to 2 weeks as well as concerning signs and symptoms to return to the emergency department.  I suspect her rectal pain is likely due to straining and we discussed good stooling habits.  Discharged in good condition.    FINAL IMPRESSION    1. Constipation        ED MEDS  Medications   sodium chloride 0.9% BOLUS 1,000 mL (0 mLs Intravenous Stopped 9/10/24 2020)   ondansetron (ZOFRAN) injection 4 mg (4 mg Intravenous $Given 9/10/24 1827)   HYDROmorphone (DILAUDID)  injection 0.5 mg (0.5 mg Intravenous $Given 9/10/24 1842)   iopamidol (ISOVUE-370) solution 90 mL (90 mLs Intravenous $Given 9/10/24 2005)   HYDROmorphone (PF) (DILAUDID) injection 0.5 mg (0.5 mg Intravenous $Given 9/10/24 2020)       LAB  Labs Ordered and Resulted from Time of ED Arrival to Time of ED Departure   BASIC METABOLIC PANEL - Abnormal       Result Value    Sodium 139      Potassium 4.2      Chloride 106      Carbon Dioxide (CO2) 23      Anion Gap 10      Urea Nitrogen 15.4      Creatinine 0.96 (*)     GFR Estimate 76      Calcium 9.1      Glucose 90     ROUTINE UA WITH MICROSCOPIC REFLEX TO CULTURE - Abnormal    Color Urine Light Yellow      Appearance Urine Clear      Glucose Urine Negative      Bilirubin Urine Negative      Ketones Urine Negative      Specific Gravity Urine 1.027      Blood Urine Negative      pH Urine 6.5      Protein Albumin Urine 10 (*)     Urobilinogen Urine <2.0      Nitrite Urine Negative      Leukocyte Esterase Urine 75 Jamila/uL (*)     Mucus Urine Present (*)     RBC Urine 3 (*)     WBC Urine <1      Squamous Epithelials Urine 2 (*)    HEPATIC FUNCTION PANEL - Normal    Protein Total 7.4      Albumin 4.1      Bilirubin Total 0.4      Alkaline Phosphatase 95      AST 18      ALT 27      Bilirubin Direct <0.20     LIPASE - Normal    Lipase 27     HCG QUALITATIVE URINE - Normal    hCG Urine Qualitative Negative     CBC WITH PLATELETS AND DIFFERENTIAL    WBC Count 4.9      RBC Count 4.42      Hemoglobin 13.6      Hematocrit 40.7      MCV 92      MCH 30.8      MCHC 33.4      RDW 12.9      Platelet Count 165      % Neutrophils 61      % Lymphocytes 27      % Monocytes 7      % Eosinophils 4      % Basophils 1      % Immature Granulocytes 1      NRBCs per 100 WBC 0      Absolute Neutrophils 3.0      Absolute Lymphocytes 1.3      Absolute Monocytes 0.4      Absolute Eosinophils 0.2      Absolute Basophils 0.1      Absolute Immature Granulocytes 0.0      Absolute NRBCs 0.0          RADIOLOGY    CT Abdomen Pelvis w Contrast   Final Result   IMPRESSION:    1.  No acute findings. No inflammatory process or bowel obstruction.   2.  Mild to moderate colonic stool burden.          DISCHARGE MEDS  New Prescriptions    No medications on file         Patti Villatoro PA-C  Emergency Medicine  [facility] EMERGENCY DEPARTMENT  [address] [place] [id number] [phone]       Sheryl Villatoro PA-C  09/10/24 2940

## 2024-12-19 NOTE — NURSING NOTE
BLOOD PRESSURE: 122/76  (If over 140/90- RN or Provider needs to evaluate)     WEIGHT: 163 lbs    DATE OF LAST PAP or ANNUAL EXAM: PAP     9/8/17  URINE HCG:not indicated    MEDICATION: Depo Provera 150mg  ROUTE: IM  SITE: RUQ - Gluteus  : [employer]  LOT #: [id number]  EXPIRATION: Jan 2020  NDC: [address]-6  NEXT INJECTION DUE:  Nov 24th- Dec 8th  Provider: Dr. Alfonso Clay  Reminder card given to patient? Yes   Date order expires: 12/8/17  Last appointment by a provider: 9/8/17      Ashley Garcia MA                   no

## 2025-01-24 ENCOUNTER — APPOINTMENT (OUTPATIENT)
Dept: CT IMAGING | Facility: HOSPITAL | Age: 45
End: 2025-01-24
Payer: COMMERCIAL

## 2025-01-24 ENCOUNTER — HOSPITAL ENCOUNTER (EMERGENCY)
Facility: HOSPITAL | Age: 45
Discharge: HOME | End: 2025-01-24
Payer: COMMERCIAL

## 2025-01-24 VITALS
HEIGHT: 66 IN | HEART RATE: 99 BPM | OXYGEN SATURATION: 95 % | WEIGHT: 186 LBS | RESPIRATION RATE: 18 BRPM | SYSTOLIC BLOOD PRESSURE: 129 MMHG | TEMPERATURE: 99.7 F | DIASTOLIC BLOOD PRESSURE: 80 MMHG | BODY MASS INDEX: 29.89 KG/M2

## 2025-01-24 DIAGNOSIS — N39.0 UTI (URINARY TRACT INFECTION): ICD-10-CM

## 2025-01-24 LAB
ALBUMIN SERPL BCG-MCNC: 4.3 G/DL (ref 3.5–5.2)
ALBUMIN UR-MCNC: 20 MG/DL
ALP SERPL-CCNC: 97 U/L (ref 40–150)
ALT SERPL W P-5'-P-CCNC: 30 U/L (ref 0–50)
ANION GAP SERPL CALCULATED.3IONS-SCNC: 10 MMOL/L (ref 7–15)
APPEARANCE UR: CLEAR
AST SERPL W P-5'-P-CCNC: 21 U/L (ref 0–45)
BACTERIA #/AREA URNS HPF: ABNORMAL /HPF
BASOPHILS # BLD AUTO: 0.1 10E3/UL (ref 0–0.2)
BASOPHILS NFR BLD AUTO: 1 %
BILIRUB DIRECT SERPL-MCNC: <0.2 MG/DL (ref 0–0.3)
BILIRUB SERPL-MCNC: 0.3 MG/DL
BILIRUB UR QL STRIP: NEGATIVE
BUN SERPL-MCNC: 11.4 MG/DL (ref 6–20)
CALCIUM SERPL-MCNC: 9.5 MG/DL (ref 8.8–10.4)
CHLORIDE SERPL-SCNC: 105 MMOL/L (ref 98–107)
COLOR UR AUTO: YELLOW
CREAT SERPL-MCNC: 1.03 MG/DL (ref 0.51–0.95)
CRP SERPL-MCNC: 6.3 MG/L
EGFRCR SERPLBLD CKD-EPI 2021: 69 ML/MIN/1.73M2
EOSINOPHIL # BLD AUTO: 0.2 10E3/UL (ref 0–0.7)
EOSINOPHIL NFR BLD AUTO: 3 %
ERYTHROCYTE [DISTWIDTH] IN BLOOD BY AUTOMATED COUNT: 13.1 % (ref 10–15)
GLUCOSE SERPL-MCNC: 101 MG/DL (ref 70–99)
GLUCOSE UR STRIP-MCNC: NEGATIVE MG/DL
HCO3 SERPL-SCNC: 24 MMOL/L (ref 22–29)
HCT VFR BLD AUTO: 44.1 % (ref 35–47)
HGB BLD-MCNC: 14.5 G/DL (ref 11.7–15.7)
HGB UR QL STRIP: NEGATIVE
IMM GRANULOCYTES # BLD: 0 10E3/UL
IMM GRANULOCYTES NFR BLD: 0 %
KETONES UR STRIP-MCNC: NEGATIVE MG/DL
LEUKOCYTE ESTERASE UR QL STRIP: ABNORMAL
LIPASE SERPL-CCNC: 25 U/L (ref 13–60)
LYMPHOCYTES # BLD AUTO: 1.8 10E3/UL (ref 0.8–5.3)
LYMPHOCYTES NFR BLD AUTO: 27 %
MCH RBC QN AUTO: 29.8 PG (ref 26.5–33)
MCHC RBC AUTO-ENTMCNC: 32.9 G/DL (ref 31.5–36.5)
MCV RBC AUTO: 91 FL (ref 78–100)
MONOCYTES # BLD AUTO: 0.5 10E3/UL (ref 0–1.3)
MONOCYTES NFR BLD AUTO: 7 %
MUCOUS THREADS #/AREA URNS LPF: PRESENT /LPF
NEUTROPHILS # BLD AUTO: 4.2 10E3/UL (ref 1.6–8.3)
NEUTROPHILS NFR BLD AUTO: 62 %
NITRATE UR QL: NEGATIVE
NRBC # BLD AUTO: 0 10E3/UL
NRBC BLD AUTO-RTO: 0 /100
PH UR STRIP: 6 [PH] (ref 5–7)
PLATELET # BLD AUTO: 218 10E3/UL (ref 150–450)
POTASSIUM SERPL-SCNC: 3.9 MMOL/L (ref 3.4–5.3)
PROT SERPL-MCNC: 7.8 G/DL (ref 6.4–8.3)
RBC # BLD AUTO: 4.87 10E6/UL (ref 3.8–5.2)
RBC URINE: 3 /HPF
SODIUM SERPL-SCNC: 139 MMOL/L (ref 135–145)
SP GR UR STRIP: 1.03 (ref 1–1.03)
SQUAMOUS EPITHELIAL: 2 /HPF
UROBILINOGEN UR STRIP-MCNC: <2 MG/DL
WBC # BLD AUTO: 6.7 10E3/UL (ref 4–11)
WBC URINE: 2 /HPF

## 2025-01-24 PROCEDURE — 96375 TX/PRO/DX INJ NEW DRUG ADDON: CPT

## 2025-01-24 PROCEDURE — 85049 AUTOMATED PLATELET COUNT: CPT

## 2025-01-24 PROCEDURE — 250N000011 HC RX IP 250 OP 636

## 2025-01-24 PROCEDURE — 86140 C-REACTIVE PROTEIN: CPT

## 2025-01-24 PROCEDURE — 82565 ASSAY OF CREATININE: CPT

## 2025-01-24 PROCEDURE — 80053 COMPREHEN METABOLIC PANEL: CPT

## 2025-01-24 PROCEDURE — 96361 HYDRATE IV INFUSION ADD-ON: CPT

## 2025-01-24 PROCEDURE — 82248 BILIRUBIN DIRECT: CPT

## 2025-01-24 PROCEDURE — 96376 TX/PRO/DX INJ SAME DRUG ADON: CPT

## 2025-01-24 PROCEDURE — 258N000003 HC RX IP 258 OP 636

## 2025-01-24 PROCEDURE — 250N000013 HC RX MED GY IP 250 OP 250 PS 637

## 2025-01-24 PROCEDURE — 85004 AUTOMATED DIFF WBC COUNT: CPT

## 2025-01-24 PROCEDURE — 81001 URINALYSIS AUTO W/SCOPE: CPT | Performed by: STUDENT IN AN ORGANIZED HEALTH CARE EDUCATION/TRAINING PROGRAM

## 2025-01-24 PROCEDURE — 82310 ASSAY OF CALCIUM: CPT

## 2025-01-24 PROCEDURE — 96374 THER/PROPH/DIAG INJ IV PUSH: CPT | Mod: 59

## 2025-01-24 PROCEDURE — 74177 CT ABD & PELVIS W/CONTRAST: CPT

## 2025-01-24 PROCEDURE — 36415 COLL VENOUS BLD VENIPUNCTURE: CPT

## 2025-01-24 PROCEDURE — 82947 ASSAY GLUCOSE BLOOD QUANT: CPT

## 2025-01-24 PROCEDURE — 99285 EMERGENCY DEPT VISIT HI MDM: CPT | Mod: 25

## 2025-01-24 PROCEDURE — 83690 ASSAY OF LIPASE: CPT

## 2025-01-24 RX ORDER — ONDANSETRON 2 MG/ML
4 INJECTION INTRAMUSCULAR; INTRAVENOUS ONCE
Status: COMPLETED | OUTPATIENT
Start: 2025-01-24 | End: 2025-01-24

## 2025-01-24 RX ORDER — SULFAMETHOXAZOLE AND TRIMETHOPRIM 800; 160 MG/1; MG/1
1 TABLET ORAL ONCE
Status: COMPLETED | OUTPATIENT
Start: 2025-01-24 | End: 2025-01-24

## 2025-01-24 RX ORDER — IBUPROFEN 600 MG/1
600 TABLET, FILM COATED ORAL EVERY 6 HOURS PRN
Qty: 30 TABLET | Refills: 0 | Status: SHIPPED | OUTPATIENT
Start: 2025-01-24

## 2025-01-24 RX ORDER — KETOROLAC TROMETHAMINE 15 MG/ML
15 INJECTION, SOLUTION INTRAMUSCULAR; INTRAVENOUS ONCE
Status: COMPLETED | OUTPATIENT
Start: 2025-01-24 | End: 2025-01-24

## 2025-01-24 RX ORDER — SULFAMETHOXAZOLE AND TRIMETHOPRIM 800; 160 MG/1; MG/1
1 TABLET ORAL 2 TIMES DAILY
Qty: 14 TABLET | Refills: 0 | Status: SHIPPED | OUTPATIENT
Start: 2025-01-24 | End: 2025-01-31

## 2025-01-24 RX ORDER — HYDROMORPHONE HYDROCHLORIDE 1 MG/ML
0.5 INJECTION, SOLUTION INTRAMUSCULAR; INTRAVENOUS; SUBCUTANEOUS ONCE
Status: COMPLETED | OUTPATIENT
Start: 2025-01-24 | End: 2025-01-24

## 2025-01-24 RX ORDER — ONDANSETRON 4 MG/1
4 TABLET, ORALLY DISINTEGRATING ORAL EVERY 8 HOURS PRN
Qty: 20 TABLET | Refills: 0 | Status: SHIPPED | OUTPATIENT
Start: 2025-01-24

## 2025-01-24 RX ORDER — IOPAMIDOL 755 MG/ML
90 INJECTION, SOLUTION INTRAVASCULAR ONCE
Status: COMPLETED | OUTPATIENT
Start: 2025-01-24 | End: 2025-01-24

## 2025-01-24 RX ADMIN — IOPAMIDOL 90 ML: 755 INJECTION, SOLUTION INTRAVENOUS at 22:17

## 2025-01-24 RX ADMIN — HYDROMORPHONE HYDROCHLORIDE 0.5 MG: 1 INJECTION, SOLUTION INTRAMUSCULAR; INTRAVENOUS; SUBCUTANEOUS at 21:36

## 2025-01-24 RX ADMIN — ONDANSETRON 4 MG: 2 INJECTION INTRAMUSCULAR; INTRAVENOUS at 22:59

## 2025-01-24 RX ADMIN — SULFAMETHOXAZOLE AND TRIMETHOPRIM 1 TABLET: 800; 160 TABLET ORAL at 22:47

## 2025-01-24 RX ADMIN — KETOROLAC TROMETHAMINE 15 MG: 15 INJECTION, SOLUTION INTRAMUSCULAR; INTRAVENOUS at 21:06

## 2025-01-24 RX ADMIN — SODIUM CHLORIDE 1000 ML: 9 INJECTION, SOLUTION INTRAVENOUS at 21:02

## 2025-01-24 RX ADMIN — HYDROMORPHONE HYDROCHLORIDE 0.5 MG: 1 INJECTION, SOLUTION INTRAMUSCULAR; INTRAVENOUS; SUBCUTANEOUS at 22:47

## 2025-01-24 RX ADMIN — ONDANSETRON 4 MG: 2 INJECTION INTRAMUSCULAR; INTRAVENOUS at 21:04

## 2025-01-24 ASSESSMENT — ACTIVITIES OF DAILY LIVING (ADL)
ADLS_ACUITY_SCORE: 41
ADLS_ACUITY_SCORE: 41

## 2025-01-24 NOTE — LETTER
January 24, 2025      To Whom It May Concern:      Mary Ma was seen in our Emergency Department today, 01/24/25.  She may return to work when improved.    Sincerely,        Ziyad Cantor RN  Electronically signed

## 2025-01-25 NOTE — ED TRIAGE NOTES
Pt presents to ED with sharp R flank pain, pt is restless and unable to sleep. Burning with urination, low back pain. Hx of kidney stones. Nausea.       [facility] 1 hour ago

## 2025-01-25 NOTE — ED NOTES
Pt's son is at bedside. Discharge paperwork reviewed with pt and son. Questions asked and answered.

## 2025-01-25 NOTE — ED NOTES
Pt wanting something to eat as she has not eaten since this morning. Jello and apple sauce given to the pt. Pt reports nausea is better and taking small bites of apple sauce.

## 2025-01-25 NOTE — ED NOTES
Pt reports that her son's car is not working and is not able to come pick her up. She understands that she cannot drive as she received IV pain medications. She is working on getting a uber or lyft ride home.

## 2025-01-25 NOTE — DISCHARGE INSTRUCTIONS
You were seen in the ER for UTI symptoms and flank pain. Your workup was overall reassuring. Your CT did not show any findings. Your urine shows some borderline signs of infection and in the setting of flank pain, will plan for treatment with antibiotics. I will also recommend zofran for nausea. Return if your symptoms worsen or you develop any new symptoms.

## 2025-01-25 NOTE — ED PROVIDER NOTES
EMERGENCY DEPARTMENT ENCOUNTER      NAME: Mary Ma  AGE: 44 year old female  YOB: 1980  MRN: [id number]  EVALUATION DATE & TIME: No admission date for patient encounter.    PCP: Halley Smith    ED PROVIDER: Sandi White PA-C      Chief Complaint   Patient presents with    Flank Pain         FINAL IMPRESSION:  1. UTI (urinary tract infection)          ED COURSE & MEDICAL DECISION MAKING:    Pertinent Labs & Imaging studies reviewed. (See chart for details)  44 year old female presents to the Emergency Department for evaluation of right flank pain and urinary symptoms that started in the middle of the night.  Reports she has been nauseous without vomiting.  Endorses dysuria and frequency and urgency.  Denies vaginal discharge or vaginal bleeding.  Denies abdominal pain.  Denies URI symptoms.  Vitals unremarkable.  Borderline febrile at 9.7 but otherwise unremarkable.  On exam, uncomfortable and anxious appearing.  Tenderness palpation in the right lower quadrant, right upper quadrant, and right flank.  No guarding or rebound.  Cardiopulmonary examination unremarkable.  Given her history of kidney stones and urinary symptoms, will plan for labs, UA, and CT.  She was seen in the waiting room due to boarding crisis so we will order Toradol and Zofran for now.  If she gets roomed can order something additional for pain if needed.    CRP mildly elevated 6.3.  LFTs and electrolytes within normal limits.  Kidney function near baseline.  May be slightly elevated here today with creatinine of 1.03.  No leukocytosis or anemia.  Urine borderline infected.  Small amount of leukocyte esterase, bacteria, mucus but also to squamous cells which could be a contaminant.  CT abdomen pelvis with no acute findings.  Including no kidney stones.  I do think in the setting of urinary symptoms and right flank pain, despite the urine not looking significantly infected, we will plan on getting her started on some  antibiotics. And they will send it to culture. She has allergies to cephalosporins.  Will plan for Bactrim.  Will also send her home with Zofran and ibuprofen.  Strict return precautions were discussed.  She was discharged in stable condition.     Medical Decision Making  Obtained supplemental history:Supplemental history obtained?: Documented in chart  Reviewed external records: External records reviewed?: Documented in chart and Outpatient Record: [facility] 9/4/2024  Care impacted by chronic illness:Documented in Chart  Did you consider but not order tests?: Work up considered but not performed and documented in chart, if applicable  Did you interpret images independently?: Independent interpretation of ECG and images noted in documentation, when applicable.  Consultation discussion with other provider:Did you involve another provider (consultant, , pharmacy, etc.)?: No  Discharge. I prescribed additional prescription strength medication(s) as charted. See documentation for any additional details.    MIPS:        0 minutes of critical care time     MEDICATIONS GIVEN IN THE EMERGENCY:  Medications   sodium chloride 0.9% BOLUS 1,000 mL (0 mLs Intravenous Stopped 1/24/25 2154)   ketorolac (TORADOL) injection 15 mg (15 mg Intravenous $Given 1/24/25 2106)   ondansetron (ZOFRAN) injection 4 mg (4 mg Intravenous $Given 1/24/25 2104)   HYDROmorphone (PF) (DILAUDID) injection 0.5 mg (0.5 mg Intravenous $Given 1/24/25 2136)   iopamidol (ISOVUE-370) solution 90 mL (90 mLs Intravenous $Given 1/24/25 2217)   HYDROmorphone (PF) (DILAUDID) injection 0.5 mg (0.5 mg Intravenous $Given 1/24/25 2247)   sulfamethoxazole-trimethoprim (BACTRIM DS) 800-160 MG per tablet 1 tablet (1 tablet Oral $Given 1/24/25 2247)   ondansetron (ZOFRAN) injection 4 mg (4 mg Intravenous $Given 1/24/25 2259)       NEW PRESCRIPTIONS STARTED AT TODAY'S ER VISIT  New Prescriptions    IBUPROFEN (ADVIL/MOTRIN) 600 MG TABLET    Take 1 tablet  "(600 mg) by mouth every 6 hours as needed for moderate pain.    ONDANSETRON (ZOFRAN ODT) 4 MG ODT TAB    Take 1 tablet (4 mg) by mouth every 8 hours as needed for nausea or vomiting.    SULFAMETHOXAZOLE-TRIMETHOPRIM (BACTRIM DS) 800-160 MG TABLET    Take 1 tablet by mouth 2 times daily for 7 days.          =================================================================    HPI    Patient information was obtained from: Patient    Use of : N/A         Mary Ma is a 44 year old female with a pertinent history of anxiety, depression, GERD, who presents to this ED by walk in for evaluation of flank pain. Patient experiencing sharp, shooting, stabbing right sided abdominal pain that wraps around to her right flank. Pain has been worsening all day today. Endorses history of kidney stones, but this pain is only slightly similar. Last night she woke up crying from pain. Has nausea without vomiting. Unable to eat or drink as normal. No urinary symptoms. Symptoms worsen with movement like walking around. No surgeries.     She adds that she has been on the Depo birth control injection for the last 13 years. She has not gotten nor wanted it this month. She does not have vaginal bleeding anymore, but she has intermittent cramping.     Per chart review, patient seen at [facility] on 9/4/2024 for evaluation of abdominal and flank pain. Given IV Toradol and hydromorphone. Patient discharged with prescription of Reglan.       VITALS:  /83   Pulse 97   Temp 99.7  F (37.6  C)   Resp 18   Ht 1.676 m (5' 6\")   Wt 84.4 kg (186 lb)   SpO2 98%   BMI 30.02 kg/m      PHYSICAL EXAM    Physical Exam  Constitutional:       Appearance: She is not toxic-appearing.      Comments: Uncomfortable appearing   HENT:      Nose: Nose normal.      Mouth/Throat:      Mouth: Mucous membranes are moist.   Cardiovascular:      Rate and Rhythm: Normal rate and regular rhythm.      Pulses: Normal pulses.      Heart " sounds: Normal heart sounds.   Pulmonary:      Effort: Pulmonary effort is normal. No respiratory distress.      Breath sounds: No stridor. No wheezing, rhonchi or rales.   Abdominal:      General: Abdomen is flat.      Tenderness: There is abdominal tenderness. There is no right CVA tenderness, left CVA tenderness, guarding or rebound.      Comments: RLQ, RUQ, and R flank tenderness to palpation    Musculoskeletal:      Cervical back: Normal range of motion.   Skin:     General: Skin is warm.      Capillary Refill: Capillary refill takes less than 2 seconds.   Neurological:      General: No focal deficit present.      Mental Status: She is alert and oriented to person, place, and time.   Psychiatric:         Mood and Affect: Mood is anxious.            LAB:  All pertinent labs reviewed and interpreted.  Results for orders placed or performed during the hospital encounter of 01/24/25   CT Abdomen Pelvis w Contrast    Impression    IMPRESSION:   1.  No evidence for hydronephrosis or pyelonephritis. No renal masses or calculi.  2.  No evidence for appendicitis.  3.  Diffuse hepatic steatosis.  4.  Small periumbilical ventral hernia containing fat stable.     UA with Microscopic reflex to Culture    Specimen: Urine, Clean Catch   Result Value Ref Range    Color Urine Yellow Colorless, Straw, Light Yellow, Yellow    Appearance Urine Clear Clear    Glucose Urine Negative Negative mg/dL    Bilirubin Urine Negative Negative    Ketones Urine Negative Negative mg/dL    Specific Gravity Urine 1.032 (H) 1.001 - 1.030    Blood Urine Negative Negative    pH Urine 6.0 5.0 - 7.0    Protein Albumin Urine 20 (A) Negative mg/dL    Urobilinogen Urine <2.0 <2.0 mg/dL    Nitrite Urine Negative Negative    Leukocyte Esterase Urine 25 Jamila/uL (A) Negative    Bacteria Urine Few (A) None Seen /HPF    Mucus Urine Present (A) None Seen /LPF    RBC Urine 3 (H) <=2 /HPF    WBC Urine 2 <=5 /HPF    Squamous Epithelials Urine 2 (H) <=1 /HPF   Basic  metabolic panel   Result Value Ref Range    Sodium 139 135 - 145 mmol/L    Potassium 3.9 3.4 - 5.3 mmol/L    Chloride 105 98 - 107 mmol/L    Carbon Dioxide (CO2) 24 22 - 29 mmol/L    Anion Gap 10 7 - 15 mmol/L    Urea Nitrogen 11.4 6.0 - 20.0 mg/dL    Creatinine 1.03 (H) 0.51 - 0.95 mg/dL    GFR Estimate 69 >60 mL/min/1.73m2    Calcium 9.5 8.8 - 10.4 mg/dL    Glucose 101 (H) 70 - 99 mg/dL   Result Value Ref Range    CRP Inflammation 6.30 (H) <5.00 mg/L   CBC with platelets and differential   Result Value Ref Range    WBC Count 6.7 4.0 - 11.0 10e3/uL    RBC Count 4.87 3.80 - 5.20 10e6/uL    Hemoglobin 14.5 11.7 - 15.7 g/dL    Hematocrit 44.1 35.0 - 47.0 %    MCV 91 78 - 100 fL    MCH 29.8 26.5 - 33.0 pg    MCHC 32.9 31.5 - 36.5 g/dL    RDW 13.1 10.0 - 15.0 %    Platelet Count 218 150 - 450 10e3/uL    % Neutrophils 62 %    % Lymphocytes 27 %    % Monocytes 7 %    % Eosinophils 3 %    % Basophils 1 %    % Immature Granulocytes 0 %    NRBCs per 100 WBC 0 <1 /100    Absolute Neutrophils 4.2 1.6 - 8.3 10e3/uL    Absolute Lymphocytes 1.8 0.8 - 5.3 10e3/uL    Absolute Monocytes 0.5 0.0 - 1.3 10e3/uL    Absolute Eosinophils 0.2 0.0 - 0.7 10e3/uL    Absolute Basophils 0.1 0.0 - 0.2 10e3/uL    Absolute Immature Granulocytes 0.0 <=0.4 10e3/uL    Absolute NRBCs 0.0 10e3/uL   Hepatic function panel   Result Value Ref Range    Protein Total 7.8 6.4 - 8.3 g/dL    Albumin 4.3 3.5 - 5.2 g/dL    Bilirubin Total 0.3 <=1.2 mg/dL    Alkaline Phosphatase 97 40 - 150 U/L    AST 21 0 - 45 U/L    ALT 30 0 - 50 U/L    Bilirubin Direct <0.20 0.00 - 0.30 mg/dL   Result Value Ref Range    Lipase 25 13 - 60 U/L       RADIOLOGY:  Reviewed all pertinent imaging. Please see official radiology report.  CT Abdomen Pelvis w Contrast   Final Result   IMPRESSION:    1.  No evidence for hydronephrosis or pyelonephritis. No renal masses or calculi.   2.  No evidence for appendicitis.   3.  Diffuse hepatic steatosis.   4.  Small periumbilical ventral hernia  containing fat stable.               I, Missy Deng, am serving as a scribe to document services personally performed by Sandi White PA-C based on my observation and the provider's statements to me. I, Sandi White PA-C, attest that Missy Deng is acting in a scribe capacity, has observed my performance of the services and has documented them in accordance with my direction.      Sandi White PA-C  [facility] EMERGENCY DEPARTMENT  [address] [place] [id number]  [phone]       Sandi White PA-C  01/25/25 1049

## 2025-03-14 ENCOUNTER — HOSPITAL ENCOUNTER (EMERGENCY)
Facility: HOSPITAL | Age: 45
Discharge: HOME | End: 2025-03-14
Attending: EMERGENCY MEDICINE | Admitting: EMERGENCY MEDICINE
Payer: COMMERCIAL

## 2025-03-14 VITALS
DIASTOLIC BLOOD PRESSURE: 82 MMHG | SYSTOLIC BLOOD PRESSURE: 127 MMHG | HEART RATE: 119 BPM | RESPIRATION RATE: 16 BRPM | OXYGEN SATURATION: 94 % | TEMPERATURE: 98.6 F

## 2025-03-14 DIAGNOSIS — F43.9 SITUATIONAL STRESS: ICD-10-CM

## 2025-03-14 PROBLEM — F43.25 ADJUSTMENT DISORDER WITH MIXED DISTURBANCE OF EMOTIONS AND CONDUCT: Status: ACTIVE | Noted: 2025-03-14

## 2025-03-14 PROCEDURE — 99283 EMERGENCY DEPT VISIT LOW MDM: CPT

## 2025-03-14 PROCEDURE — 250N000013 HC RX MED GY IP 250 OP 250 PS 637: Performed by: EMERGENCY MEDICINE

## 2025-03-14 RX ORDER — DULOXETIN HYDROCHLORIDE 60 MG/1
60 CAPSULE, DELAYED RELEASE ORAL ONCE
Status: COMPLETED | OUTPATIENT
Start: 2025-03-14 | End: 2025-03-14

## 2025-03-14 RX ORDER — IBUPROFEN 600 MG/1
600 TABLET, FILM COATED ORAL ONCE
Status: COMPLETED | OUTPATIENT
Start: 2025-03-14 | End: 2025-03-14

## 2025-03-14 RX ORDER — LANSOPRAZOLE 30 MG/1
30 CAPSULE, DELAYED RELEASE ORAL ONCE
Status: COMPLETED | OUTPATIENT
Start: 2025-03-14 | End: 2025-03-14

## 2025-03-14 RX ADMIN — IBUPROFEN 600 MG: 600 TABLET ORAL at 15:55

## 2025-03-14 RX ADMIN — LANSOPRAZOLE 30 MG: 30 CAPSULE, DELAYED RELEASE ORAL at 17:29

## 2025-03-14 RX ADMIN — DULOXETINE HYDROCHLORIDE 60 MG: 60 CAPSULE, DELAYED RELEASE ORAL at 17:28

## 2025-03-14 ASSESSMENT — COLUMBIA-SUICIDE SEVERITY RATING SCALE - C-SSRS
3. HAVE YOU BEEN THINKING ABOUT HOW YOU MIGHT KILL YOURSELF?: NO
1. IN THE PAST MONTH, HAVE YOU WISHED YOU WERE DEAD OR WISHED YOU COULD GO TO SLEEP AND NOT WAKE UP?: YES
2. HAVE YOU ACTUALLY HAD ANY THOUGHTS OF KILLING YOURSELF IN THE PAST MONTH?: YES
5. HAVE YOU STARTED TO WORK OUT OR WORKED OUT THE DETAILS OF HOW TO KILL YOURSELF? DO YOU INTEND TO CARRY OUT THIS PLAN?: NO
4. HAVE YOU HAD THESE THOUGHTS AND HAD SOME INTENTION OF ACTING ON THEM?: NO

## 2025-03-14 ASSESSMENT — ACTIVITIES OF DAILY LIVING (ADL)
ADLS_ACUITY_SCORE: 41
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
## 2025-03-14 NOTE — Clinical Note
Mary Ma was seen and treated in our emergency department on 3/14/2025.  She may return to work on 03/17/2025.       If you have any questions or concerns, please don't hesitate to call.      Dennis Manzano MD
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
## 2025-03-14 NOTE — DISCHARGE INSTRUCTIONS
[facility] CRISIS LINE: [phone]  SHELTER CONNECT LINE:  [phone] (THEIR PHONE LINE OPENS UP AT 7:30 PM)    You may self refer for most Crisis Residence services. This is a short-term service, typically 3-10 days, for stabilization when experiencing a mental health crisis. They provide housing and treatment services that integrate mental health, medical, and substance use care.    [facility] - [employer].  Main phone: [phone]   Direct: [phone]   [address] [place] [address]      [facility] [place] [facility]   [phone]   [address] [facility] [address]     [facility] - [employer].  Main phone: [phone]   Direct: [phone]   [address] [place] [address]     [facility]  [phone]   [address] [place] [address]       Wherever you attend for a crisis residence, if possible bring any medications you may have in their prescribed bottles.   [facility]  Various locations  [phone]  Housing services/shelter, Counseling, Pregnant to Parenting Services, self-sufficiency services     [facility] [address], [address]  [place], [place] [address] [phone]  [employer] [facility] Programs: Housing stabilization/ homeless transitions services, foreclosure prevention, reverse mortgage, homebuyer workshops, home rehab and repair.       [facility]  [address]   [place], [place] [address] [phone]  Emergency shelter, and counseling services to those experiencing domestic violence, sexual assault or human trafficking      [facility]  [address] [place], [place] [id number] [phone]  Offers free legal advice to [place] tenants on their renter rights through a hotline service.     [facility]  [address] [facility] [address]  [place], [place] [id number] [phone]  Drop-in Center, Housing Programs, Job Skills Training,  On-site Tutors         [employer]  Search for affordable apartments, houses, duplexes, townhomes or condos throughout [place]     [facility] ([facility])  Serves residents of [facility], [facility], [place], [facility]  [address] [place] [address]  [phone]   Families at risk of homelessness can receive help with emergency rent assistance, basic needs, referrals and resources and help connecting to shelter resources     [facility]  [address] [place] [address]  [phone]  Provides short-term housing to homless pregnant women and women with women with young children.      [facility]   Various locations  [phone]  Continuum of permanent and transitional housing and support services to help families obtain and maintain affordable housing.          [facility]  Serves residents of  [facility]  [address] [place] [address]    [place] [address]  [phone]  Emergency housing payments (rent or mortgage)     [facility]  Various locations  [phone]  Provides full range of housing & healthcare options for seniors       [facility]  Various Locations  [facility]: [phone]  [facility]: [phone]  Low cost financing to fix up your home. Down payment assistance for home buyers is also available in many [facility]     [facility]  Various Locations  [phone]  Self-sufficiency support for youth experiencing homelessness, [facility] Juvenile group home Alternative, Programming for minor victims of sex trafficking     [facility]  Various locations  Crisis line: [phone]  Shelter & Housing, counseling & therapy, legal services, jobs, budgeting, and youth services

## 2025-03-15 ENCOUNTER — TELEPHONE (OUTPATIENT)
Dept: EMERGENCY MEDICINE | Facility: CLINIC | Age: 45
End: 2025-03-15
Payer: COMMERCIAL

## 2025-03-15 NOTE — TELEPHONE ENCOUNTER
Triage and Transition Services- Patient Follow Up Call  Service Line Making Phone Call: Telemedicine    Who did Writer Talk to: Patient    Details of Call: left message to return my call on answering machine    Gregory Burch 3/15/2025 3:03 PM

## 2025-07-13 ENCOUNTER — HEALTH MAINTENANCE LETTER (OUTPATIENT)
Age: 45
End: 2025-07-13